# Patient Record
Sex: FEMALE | Race: BLACK OR AFRICAN AMERICAN | NOT HISPANIC OR LATINO | Employment: FULL TIME | URBAN - METROPOLITAN AREA
[De-identification: names, ages, dates, MRNs, and addresses within clinical notes are randomized per-mention and may not be internally consistent; named-entity substitution may affect disease eponyms.]

---

## 2019-07-15 ENCOUNTER — OFFICE VISIT (OUTPATIENT)
Dept: FAMILY MEDICINE | Facility: CLINIC | Age: 54
End: 2019-07-15
Payer: COMMERCIAL

## 2019-07-15 VITALS
SYSTOLIC BLOOD PRESSURE: 106 MMHG | DIASTOLIC BLOOD PRESSURE: 68 MMHG | OXYGEN SATURATION: 97 % | HEART RATE: 73 BPM | RESPIRATION RATE: 18 BRPM | HEIGHT: 61 IN | TEMPERATURE: 98.1 F | BODY MASS INDEX: 28.89 KG/M2 | WEIGHT: 153 LBS

## 2019-07-15 DIAGNOSIS — Z12.39 SCREENING FOR BREAST CANCER: ICD-10-CM

## 2019-07-15 DIAGNOSIS — Z86.2 H/O THROMBOCYTOSIS: ICD-10-CM

## 2019-07-15 DIAGNOSIS — Z12.11 SCREENING FOR COLON CANCER: ICD-10-CM

## 2019-07-15 DIAGNOSIS — D50.9 IRON DEFICIENCY ANEMIA, UNSPECIFIED IRON DEFICIENCY ANEMIA TYPE: Primary | ICD-10-CM

## 2019-07-15 LAB
BASOPHILS # BLD AUTO: 0.1 THOU/UL (ref 0–0.2)
BASOPHILS NFR BLD AUTO: 1 % (ref 0–2)
EOSINOPHIL # BLD AUTO: 0.2 THOU/UL (ref 0–0.4)
EOSINOPHIL NFR BLD AUTO: 3 % (ref 0–6)
ERYTHROCYTE [DISTWIDTH] IN BLOOD BY AUTOMATED COUNT: 38 % (ref 11–14.5)
HCT VFR BLD AUTO: 38.7 % (ref 35–47)
HGB BLD-MCNC: 10.3 G/DL (ref 12–16)
LYMPHOCYTES # BLD AUTO: 2 THOU/UL (ref 0.8–4.4)
LYMPHOCYTES NFR BLD AUTO: 24 % (ref 20–40)
MCH RBC QN AUTO: 20.8 PG (ref 27–34)
MCHC RBC AUTO-ENTMCNC: 26.6 G/DL (ref 32–36)
MCV RBC AUTO: 78 FL (ref 80–100)
MONOCYTES # BLD AUTO: 0.7 THOU/UL (ref 0–0.9)
MONOCYTES NFR BLD AUTO: 9 % (ref 2–10)
NEUTROPHILS # BLD AUTO: 5.1 THOU/UL (ref 2–7.7)
NEUTROPHILS NFR BLD AUTO: 63 % (ref 50–70)
OVALOCYTES BLD QL SMEAR: ABNORMAL
PLATELET # BLD AUTO: 275 THOU/UL (ref 140–440)
PLATELET BLD QL SMEAR: NORMAL
PMV BLD AUTO: ABNORMAL FL (ref 8.5–12.5)
RBC # BLD AUTO: 4.96 MILL/UL (ref 3.8–5.4)
SCHISTOCYTES BLD QL SMEAR: ABNORMAL
WBC # BLD AUTO: 8.1 THOU/UL (ref 4–11)

## 2019-07-15 SDOH — HEALTH STABILITY: MENTAL HEALTH: HOW OFTEN DO YOU HAVE A DRINK CONTAINING ALCOHOL?: NEVER

## 2019-07-15 ASSESSMENT — MIFFLIN-ST. JEOR: SCORE: 1223.44

## 2019-07-15 NOTE — PROGRESS NOTES
Preceptor Attestation:   Patient seen, evaluated and discussed with the resident. I have verified the content of the note, which accurately reflects my assessment of the patient and the plan of care.   Supervising Physician:  Donaldo Colón MD

## 2019-07-15 NOTE — PROGRESS NOTES
ASSESSMENT AND PLAN     1. Iron deficiency anemia, unspecified iron deficiency anemia type  Admitted to the hospital from 7/7/19 to 7/9/2019 secondary to iron deficiency anemia.  Received 2 units of packed red blood cells along with IV iron transfusion.  Has had a history of menorrhagia for several years.  She does have a year between 2013 and 2014 where she did not have her menses.  Return to menses occur at 2014 and she has been having vaginal bleeding every month since then.  Need to rule out all gynecology causes of anemia.  Possible etiologies include uterine/endometrial cancer, fibroid, et will refer to OB/GYN at this time.  Will likely need pelvic ultrasound, in which they could do in clinic.  We will follow-up in the next 4 weeks to discuss.  - OB/GYN REFERRAL; Future  - CBC w/ Diff and Plt ([facility])  - Manual Diff ([id number])    2. H/O thrombocytosis  Likely secondary to reactive thrombocytosis in setting of iron deficiency anemia while in the hospital.  Will retrieve a new platelet count today.  Other possibilities and after consulting with hematology while patient was in the hospital would recommend that if still remains elevated to do a femoral sided ptosis work-up including a Leonidas 2 analysis.  We will hold off on that today.    3. Screening for breast cancer  Has never had a mammogram before.  Would love to be screened for mammogram today given recent hospitalization.  - PCS Use: Screening Digital Bilateral Mammogram; Future  - GASTROENTEROLOGY ADULT REF PROCEDURE ONLY; Future    4. Screening for colon cancer  Admitted to the hospital with iron deficiency anemia.  Has never been screened for colon cancer before.  The etiology of iron deficiency anemia includes colon cancer.  Need to rule out all causes of her anemia other than OB/GYN.  Agreeable to get a colon cancer screening  - PCS Use: Screening Digital Bilateral Mammogram; Future  - GASTROENTEROLOGY ADULT REF PROCEDURE ONLY; Future      RTC in  4 weeks for follow up or sooner if develops new or worsening symptoms.    The patient was seen by, and discussed with, Dr. Angel Colón who agrees with the plan.    Elayne Cam MD PGY2  [facility]         [facility]       Lenora Biggs is a 53 year old  female with a PMH significant for:   There is no problem list on file for this patient.    She presents for a follow up from her hospital stay for iron deficiency anemia.      From her discharge summary from [facility] Hospitalization from 7/7/19-7/9/19  She presented to the [facility] ED with dyspnea on exertion,moderate right upper back pain and lower back pain/abdominal cramping type pain consistent with menstrual symptoms. Initial evaluation in the ED revealed a hemoglobin of 7.0, with platelets of 760. BMP unremarkable and LFTs were normal. Troponin negative. UA negative for blood or RBCs. Normal EKG. Follow up Hgb was 6.8. CBC with diff showed microcytic anemia with thrombocytosis consistent with anemia and secondary thrombocytosis. Iron studies were all low, LDH was normal, FSH and TSH normal. Patient discussed with pathology who recommended JAK2 analysis and repeat platelet count one week after discharge. She received 1 unit pRBC and 1000mg iron dextran on 7/8/19 and tolerated both well. Repeat Hbgs were 8.4 then 8.1. Patient discharged home with follow up with PCP. Prescribed ferrous sulfate 325mg BID and ibuprofen 200mg and tizanidine 4mg    History of menses every month. Has not had any vaginal bleeding since hospitalization. 2013 stopped for a year and then started back on 2014.     Has had increased fatigue in the morning. Iron pills have been going down well. Has not had post ingestion nausea or vomiting. Having normal BMs. Back pain is much improved, having slight pain intermittently with movements. Denies abdominal pain, myalgias, polyarthralgias.       PMH, Medications and Allergies were reviewed and updated as  "needed.        REVIEW OF SYSTEMS     ROS reviewed in HPI        OBJECTIVE     Vitals:    07/15/19 1404   BP: 106/68   BP Location: Right arm   Patient Position: Sitting   Cuff Size: Adult Regular   Pulse: 73   Resp: 18   Temp: 98.1  F (36.7  C)   TempSrc: Oral   SpO2: 97%   Weight: 69.4 kg (153 lb)   Height: 1.537 m (5' 0.5\")     Body mass index is 29.39 kg/m .    Constitutional: Awake, alert, cooperative, no apparent distress, and appears stated age.  Eyes: PERRL, EOMI, normal conjunctiva/sclerae  ENT: Normocephalic, atraumatic; no oropharyngeal pallor or erythema  Neck: Supple, symmetrical, trachea midline, no adenopathy, thyroid symmetric, not enlarged and no tenderness, skin normal.  Hematologic / Lymphatic: No cervical lymphadenopathy and no supraclavicular lymphadenopathy.  Back: Symmetric, no curvature, no spinal/paraspinal tenderness, no CVA tenderness  Lungs: No increased work of breathing, good air exchange, clear to auscultation bilaterally, no crackles or wheezing.  Cardiovascular: Regular rate and rhythm, normal S1 and S2, no S3 or S4, and no murmur noted.  Chest / Breast: Breasts symmetrical, skin without lesion(s), no nipple retraction or dimpling, no nipple discharge, no masses palpated, no axillary or supraclavicular adenopathy.  Abdomen: No scars, normal bowel sounds, soft, non-distended, non-tender, no masses palpated, no hepatosplenomegaly.  Musculoskeletal: No redness, warmth, or swelling of the joints.  Full range of motion noted.  Motor strength is 5 out of 5 all extremities bilaterally.  Tone is normal.  Neurologic: Grossly normal  Neuropsychiatric: Normal affect, mood, orientation, memory and insight.  Skin: No rashes, erythema, pallor, petechia or purpura.    No results found for this or any previous visit (from the past 24 hour(s)).          "

## 2019-07-15 NOTE — PATIENT INSTRUCTIONS
Great to see you today Elizabeth!    Our clinic will call you to set up your mammogram and colonoscopy.     Please go down to the lab to have your blood levels drawn. Also, stop at the desk in the lobby to help with your gynecology referral.    Please make an appointment to see me in the next couple weeks for a complete physical.    Thank you,    Dr. Cam    OB/GYN REFERRAL faxed they will call patient to schedule  [facility]  Phone: [phone]  Fax: [phone]   Referral, demographics, labs and office note faxed to [phone].    GASTROENTEROLOGY ADULT REF PROCEDURE ONLY  July 17, 2019 Online referral placed with [facility] who will contact patient to schedule.     [facility]  Phone [phone]  Fax: [phone]    [facility]  [address]  [place], [place] [address]    [facility]  [address]Cl[address]) [address] (Endoscopy Center)  [place], [place] [address]    [facility]  [address] [place] [address]    [facility]  [address] (use the outside entrance)  [place], [place] [address]    [facility]  [address]  [place], [place] [address]      [facility]  Address: [address] [place] [address]  Phone number: [phone]    Faxed referral over they will contact the patient to schedule the appointment. GALI Pugh

## 2019-07-17 ENCOUNTER — RECORDS - HEALTHEAST (OUTPATIENT)
Dept: ADMINISTRATIVE | Facility: OTHER | Age: 54
End: 2019-07-17

## 2019-07-17 ENCOUNTER — TELEPHONE (OUTPATIENT)
Dept: FAMILY MEDICINE | Facility: CLINIC | Age: 54
End: 2019-07-17

## 2019-07-17 ENCOUNTER — TRANSFERRED RECORDS (OUTPATIENT)
Dept: HEALTH INFORMATION MANAGEMENT | Facility: CLINIC | Age: 54
End: 2019-07-17

## 2019-07-17 NOTE — TELEPHONE ENCOUNTER
Clinic recieved a call to fax records to [facility] for patients appointment on 07/17/19. This SW assisted and faxed office visit notes from 07/15/19 and labs to [facility]. Faxed at 9:00am on this date.     RODGER Quesada

## 2019-07-22 ENCOUNTER — RECORDS - HEALTHEAST (OUTPATIENT)
Dept: RADIOLOGY | Facility: CLINIC | Age: 54
End: 2019-07-22

## 2019-07-22 ENCOUNTER — HOSPITAL ENCOUNTER (OUTPATIENT)
Dept: MAMMOGRAPHY | Facility: CLINIC | Age: 54
Discharge: HOME | End: 2019-07-22

## 2019-07-22 DIAGNOSIS — Z12.31 VISIT FOR SCREENING MAMMOGRAM: ICD-10-CM

## 2019-07-28 PROBLEM — D50.9 ANEMIA, IRON DEFICIENCY: Status: ACTIVE | Noted: 2019-07-28

## 2019-08-05 ENCOUNTER — OFFICE VISIT (OUTPATIENT)
Dept: FAMILY MEDICINE | Facility: CLINIC | Age: 54
End: 2019-08-05
Payer: COMMERCIAL

## 2019-08-05 ENCOUNTER — DOCUMENTATION ONLY (OUTPATIENT)
Dept: PSYCHOLOGY | Facility: CLINIC | Age: 54
End: 2019-08-05

## 2019-08-05 VITALS
SYSTOLIC BLOOD PRESSURE: 133 MMHG | BODY MASS INDEX: 28.25 KG/M2 | DIASTOLIC BLOOD PRESSURE: 80 MMHG | TEMPERATURE: 98.2 F | HEIGHT: 61 IN | OXYGEN SATURATION: 98 % | WEIGHT: 149.6 LBS | HEART RATE: 63 BPM | RESPIRATION RATE: 16 BRPM

## 2019-08-05 DIAGNOSIS — G47.00 INSOMNIA, UNSPECIFIED TYPE: Primary | ICD-10-CM

## 2019-08-05 DIAGNOSIS — D50.9 IRON DEFICIENCY ANEMIA, UNSPECIFIED IRON DEFICIENCY ANEMIA TYPE: ICD-10-CM

## 2019-08-05 RX ORDER — TRAZODONE HYDROCHLORIDE 50 MG/1
50 TABLET, FILM COATED ORAL AT BEDTIME
Qty: 30 TABLET | Refills: 3 | Status: SHIPPED | OUTPATIENT
Start: 2019-08-05 | End: 2019-10-15

## 2019-08-05 RX ORDER — FERROUS SULFATE 325(65) MG
325 TABLET ORAL
COMMUNITY

## 2019-08-05 ASSESSMENT — MIFFLIN-ST. JEOR: SCORE: 1208.02

## 2019-08-05 NOTE — PROGRESS NOTES
Review of Dr. Cam's order and note indicates that this is a referral for psychotherapy for treatment of anxiety, insomnia, and fatigue. Currently we are closed to in-house referrals so will ask care coordinator to please reach out to patient and provide the patient with the following referral options:     [facility]  [address]   [address]  [place] [address]  [phone] (for appointments)  Fax: [phone]  [facility]  [address]  [address]  [place] [address]  Phone:  [phone]  Fax: [phone]  Hours:  Monday - Friday 8:30 - 5:00pm    [facility]  [address]  [place] [address]  [phone]    [facility]  [address], [place] [address]  [phone]    Let me know if you have questions or would like additional follow up from me.  Thanks!        Elayne Bustamante, PhD  Behavioral Health Fellow    Disclaimer  The above treatment recommendations are based on consultation with the patient's primary care provider and a review of relevant information in EPIC.? I have not personally examined the patient.? All recommendations should be implemented with considerations of the patient's relevant prior history and current clinical status.  Please contact me with any questions about the care of this patient.

## 2019-08-05 NOTE — LETTER
August 8, 2019      Lenora Biggs  [id number]  [place] [address]        Dear Lenora,    Our records indicate that you and your provider recently discussed you receiving psychotherapy. Unfortunately our mental health team is unable to take on new patients at this time. Below is a list of psychotherapy providers in your community; please consider setting up an appointment with one of these clinics. Feel free to contact [facility] at [phone] with any questions or concerns.    [facility]  [address] [place] [address]   [address]  [place] [address]  [phone] (for appointments)  Fax: [phone]  [facility] - [address]  [address]  [place] [address]  Phone:  [phone]  Fax: [phone]  Hours:  Monday - Friday 8:30 - 5:00pm     [facility]  [address]  [place] [address]  [phone]     [facility]  [address]  [address]  [place], [place] [address]  [phone]      Sincerely,    RICKEY Valle

## 2019-08-05 NOTE — PROGRESS NOTES
Preceptor Attestation:   Patient seen, evaluated and discussed with the resident. I have verified the content of the note, which accurately reflects my assessment of the patient and the plan of care.   Supervising Physician:  Rui Milton MD

## 2019-08-05 NOTE — PATIENT INSTRUCTIONS
Great to see you today Elizabeth!    Great job on following up with OB/GYN and working with Dr. Yeboah. Please let me know how the procedure goes and see me after.    We will start you on a medication called trazadone for sleep. You will start on 50mg nightly. Please let me know if you experience any symptoms like dizziness, headache, or confusion. Call clinic any time to get in contact with me. Also, the mental health team will be contacting you soon.     Please see me in 2 weeks for a follow up visit.    Thank you,  Dr. Cam      MENTAL HEALTH REFERRAL    August 5, 2019  Mental Health referral routed to behavioral health team for recommendations. See Documentation Only encounter for more information.  RICKEY Valle  8/5/2019
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
## 2019-08-05 NOTE — PROGRESS NOTES
Behavioral Health Team,    Patient is being referred for mental health services by their provider, Dr. Cam.  Please advise if we are able to see patient for in house treatment or if a community option would be best.    Thank you,    RICKEY Valle

## 2019-08-06 NOTE — PROGRESS NOTES
I have reviewed and agree with the behavioral health fellow's summary and recommendations.  Chacha Palmer, PhD., LP

## 2019-08-06 NOTE — PROGRESS NOTES
RAIMUNDO contacted pt ([phone]) regarding mental health referral. No answer, left message requesting call to clinic.    RICKEY Valle  8/6/2019    ADDENDUM 8/8/2019 2:10 PM:  RAIMUNDO contacted pt ([phone]) regarding mental health referral. No answer, left message requesting call to clinic.    As pt has yet to contact clinic, RAIMUNDO will mail letter with clinics recommended by mental health team.    RICKEY Valle  8/6/2019

## 2019-08-19 ENCOUNTER — OFFICE VISIT (OUTPATIENT)
Dept: FAMILY MEDICINE | Facility: CLINIC | Age: 54
End: 2019-08-19
Payer: COMMERCIAL

## 2019-08-19 VITALS
HEART RATE: 77 BPM | TEMPERATURE: 97.9 F | HEIGHT: 60 IN | BODY MASS INDEX: 29.96 KG/M2 | DIASTOLIC BLOOD PRESSURE: 76 MMHG | WEIGHT: 152.6 LBS | SYSTOLIC BLOOD PRESSURE: 116 MMHG

## 2019-08-19 DIAGNOSIS — Z01.818 PREOP GENERAL PHYSICAL EXAM: Primary | ICD-10-CM

## 2019-08-19 DIAGNOSIS — D50.0 IRON DEFICIENCY ANEMIA DUE TO CHRONIC BLOOD LOSS: ICD-10-CM

## 2019-08-19 LAB
BASOPHILS # BLD AUTO: 0.1 THOU/UL (ref 0–0.2)
BASOPHILS NFR BLD AUTO: 1 % (ref 0–2)
EOSINOPHIL # BLD AUTO: 0.2 THOU/UL (ref 0–0.4)
EOSINOPHIL NFR BLD AUTO: 2 % (ref 0–6)
ERYTHROCYTE [DISTWIDTH] IN BLOOD BY AUTOMATED COUNT: ABNORMAL % (ref 11–14.5)
HCT VFR BLD AUTO: 44.2 % (ref 35–47)
HGB BLD-MCNC: 14.1 G/DL (ref 12–16)
LYMPHOCYTES # BLD AUTO: 1.9 THOU/UL (ref 0.8–4.4)
LYMPHOCYTES NFR BLD AUTO: 22 % (ref 20–40)
MCH RBC QN AUTO: 26.7 PG (ref 27–34)
MCHC RBC AUTO-ENTMCNC: 31.9 G/DL (ref 32–36)
MCV RBC AUTO: 84 FL (ref 80–100)
MONOCYTES # BLD AUTO: 0.5 THOU/UL (ref 0–0.9)
MONOCYTES NFR BLD AUTO: 5 % (ref 2–10)
NEUTROPHILS # BLD AUTO: 6.1 THOU/UL (ref 2–7.7)
NEUTROPHILS NFR BLD AUTO: 70 % (ref 50–70)
OVALOCYTES BLD QL SMEAR: ABNORMAL
PLATELET # BLD AUTO: 174 THOU/UL (ref 140–440)
PLATELET BLD QL SMEAR: NORMAL
PMV BLD AUTO: ABNORMAL FL (ref 8.5–12.5)
RBC # BLD AUTO: 5.28 MILL/UL (ref 3.8–5.4)
WBC # BLD AUTO: 8.7 THOU/UL (ref 4–11)

## 2019-08-19 ASSESSMENT — MIFFLIN-ST. JEOR: SCORE: 1213.69

## 2019-08-19 NOTE — PROGRESS NOTES
[address] [address]  Phone: [phone]  Fax: [phone]    8/19/2019    Adult PRE-OP Evaluation:    Lenora Biggs, 1965 presents for pre-operative evaluation and assessment as requested by Dr. Yeboah, prior to undergoing surgery/procedure for treatment of  Abnormal uterine bleeding .    Proposed procedure: Dilation and curettage, endometrial biopsy and uterine ablation.     Date of Surgery/ Procedure: 8/22/19  Hospital/Surgical Facility: [facility], Fax: [phone]     Primary Physician: Elayne Cam MD PGY2  Type of Anesthesia Anticipated: General  History of anesthesia complications: NONE  History of  abnormal bleeding: YES: Personal HX (Hospitalization on 7/7/19-7/9/19 for iron deficiency anemia/abnormal uterine bleeding)  History of blood transfusions: YES.  No complications.  Patient has a Health Care Directive or Living Will:  NO    Preoperative Questions   1. NO - Do you have a history of heart attack, stroke, stent, bypass or surgery on an artery in the head, neck, heart or legs?  2. NO - Do you ever have any pain or discomfort in your chest?  3. NO - Have you ever had a severe pain across the front of your chest lasting for half an hour or more?  4. NO - Do you have a history of Congestive Heart Failure?  5. NO - Are you troubled by shortness of breath when: walking on the level/ up a slight hill/ at night?  6. NO - Does your chest ever sound wheezy or whistling?  7. NO - Do you currently have a cold, bronchitis or other respiratory infection?  8. NO - Have you had a cold, bronchitis or other respiratory infection within the last 2 weeks?  9. YES - Do you usually have a cough? Has a nighttime cough. History of 0.5ppd smoking. Productive cough.   10. NO - Do you sometimes get pains in the calves of your legs when you walk?  11. NO - Do you or anyone in your family have previous history of blood clots?  12. NO - Do you or does anyone in your  family have a serious bleeding problem such as prolonged bleeding following surgeries or cuts?  13. YES - Have you ever had problems with anemia or been told to take iron pills? Iron deficiency anemia caused by chronic blood loss. Has history of abnormal uterine bleeding.   14. YES - Have you had any abnormal blood loss such as black, tarry or bloody stools, or abnormal vaginal bleeding? Yes, hospitalized and needed 2 units of pRBCs on 7/07/19-7/09/19. Has been stable after.   15. YES - Have you ever had a blood transfusion? 2 units received at hospital stay for chronic blood loss anemia.   16. NO - Have you or any of your relatives ever had problems with anesthesia?  17. NO - Do you have sleep apnea, excessive snoring or daytime drowsiness?  18. NO - Do you have any prosthetic heart valves?  19. NO - Do you have prosthetic joints?  20. NO - Is there any chance that you may be pregnant?    Patient Active Problem List   Diagnosis     Anemia, iron deficiency         Current Outpatient Medications on File Prior to Visit:  ferrous sulfate (FEROSUL) 325 (65 Fe) MG tablet Take 325 mg by mouth 3 times daily (with meals)   traZODone (DESYREL) 50 MG tablet Take 1 tablet (50 mg) by mouth At Bedtime     No current facility-administered medications on file prior to visit.     OTC products: None, except as noted above, no recent use of OTC ASA, NSAIDS or Steroids and no use of herbal medications or other supplements    No Known Allergies  Latex Allergy: NO    Social History     Socioeconomic History     Marital status: Single     Spouse name: Not on file     Number of children: Not on file     Years of education: Not on file     Highest education level: Not on file   Occupational History     Not on file   Social Needs     Financial resource strain: Not on file     Food insecurity:     Worry: Not on file     Inability: Not on file     Transportation needs:     Medical: Not on file     Non-medical: Not on file   Tobacco Use      Smoking status: Current Every Day Smoker     Types: Cigarettes     Smokeless tobacco: Never Used   Substance and Sexual Activity     Alcohol use: Never     Frequency: Never     Drug use: Yes     Types: Marijuana     Sexual activity: Not on file   Lifestyle     Physical activity:     Days per week: Not on file     Minutes per session: Not on file     Stress: Not on file   Relationships     Social connections:     Talks on phone: Not on file     Gets together: Not on file     Attends Temple service: Not on file     Active member of club or organization: Not on file     Attends meetings of clubs or organizations: Not on file     Relationship status: Not on file     Intimate partner violence:     Fear of current or ex partner: Not on file     Emotionally abused: Not on file     Physically abused: Not on file     Forced sexual activity: Not on file   Other Topics Concern     Not on file   Social History Narrative     Not on file       REVIEW OF SYSTEMS:   Constitutional, HEENT, cardiovascular, pulmonary, gi and gu systems are negative, except as otherwise noted.    EXAM:     Patient Vitals for the past 24 hrs:   BP Temp Pulse Height Weight   08/19/19 1006 116/76 97.9  F (36.6  C) 77 1.524 m (5') 69.2 kg (152 lb 9.6 oz)     Body mass index is 29.8 kg/m .  GENERAL: healthy, alert and no distress  EYES: Eyes grossly normal to inspection, extraocular movements - intact, and PERRL  HENT: ear canals- normal; TMs- normal; Nose- normal; Mouth- no ulcers, no lesions  NECK: no tenderness, no adenopathy, no asymmetry, no masses, no stiffness; thyroid- normal to palpation  RESP: lungs clear to auscultation - no rales, no rhonchi, no wheezes  CV: regular rates and rhythm, normal S1 S2, no S3 or S4 and no murmur, no click or rub  ABDOMEN: soft, no tenderness, no  hepatosplenomegaly, no masses, normal bowel sounds  MS: extremities- no gross deformities noted, no edema  SKIN: no suspicious lesions, no rashes  NEURO: strength and  tone- normal, sensory exam- grossly normal, mentation- intact, speech- normal, reflexes- symmetric  BACK: no CVA tenderness, no paralumbar tenderness  PSYCH: Alert and oriented times 3; speech- coherent , normal rate and volume; able to articulate logical thoughts  LYMPHATICS: ant. cervical- normal, post. cervical- normal    DIAGNOSTICS:      EKG: Not indicated due to non-vascular surgery and low risk of event (age <65 and without cardiac risk factors)  Hemoglobin (indicated for history of anemia or procedure with significant blood loss such as tonsillectomy, major intraperitoneal surgery, vascular surgery, major spine surgery, total joint replacement)  Labs Drawn and in Process: Hemglobin  Unresulted Labs Ordered in the Past 30 Days of this Admission     No orders found from 7/20/2019 to 8/20/2019.          RISK ASSESSMENT:     Cardiovascular Risk:  -Patient is able to participate in strenuous activities without chest pain.  -The patient does not have chest pain  -Patient does not have a history of congestive heart failure.    -The patient does not have a history of stroke and does not have a history of valvular disease.    Pulmonary Risk:  -In terms of risk factors for pulmonary complication, the patient has no risk factors.    Perioperative Complications:  -The patient has a history of bleeding or clotting problems in the past.    -The patient has not had complications from surgeries.    -The patient does not have a family history of any anesthesia or surgical complications.      IMPRESSION:   Reason for surgery/procedure: abnormal uterine bleeding, iron deficiency anemia.    The proposed surgical procedure is considered LOW risk.    For above listed surgery and anesthesia:   Patient is at moderate risk for surgery/procedure and perioperative/procedure complications. History of chronic blood loss.     RECOMMENDATIONS:     Labs:  Hgb  Platelets drawn on 7/15/19 and were within normal range    Fasting:  NPO for 12  hours prior to surgery    Preop Plan:  --Approval given to proceed with proposed procedure, without further diagnostic evaluation  --Patient has  Anemia and does not require treatment prior to surgery.  Monitor Hemoglobin postoperatively.    Medications:  Patient should hold their regular medications the morning of surgery unless otherwise instructed.    Hold aspirin 7 days prior to surgery.  Hold ibuprofen for 5 days prior.      Elayne Cam MD PGY2  [facility]    Please contact our office if there are any further questions or information required about this patient.

## 2019-08-19 NOTE — PROGRESS NOTES
Preceptor attestation:  Vital signs reviewed: /76   Pulse 77   Temp 97.9  F (36.6  C)   Ht 1.524 m (5')   Wt 69.2 kg (152 lb 9.6 oz)   LMP 07/22/2019 (Exact Date)   BMI 29.80 kg/m      Patient seen, evaluated, and discussed with the resident.  I have verified the content of the note, which accurately reflects my assessment of the patient and the plan of care.    Supervising physician: Aracely Rashid MD  [facility]

## 2019-08-22 ENCOUNTER — SURGERY - HEALTHEAST (OUTPATIENT)
Dept: SURGERY | Facility: HOSPITAL | Age: 54
End: 2019-08-22

## 2019-08-22 ENCOUNTER — ANESTHESIA - HEALTHEAST (OUTPATIENT)
Dept: SURGERY | Facility: HOSPITAL | Age: 54
End: 2019-08-22

## 2019-08-22 ASSESSMENT — MIFFLIN-ST. JEOR: SCORE: 1205.5

## 2019-08-30 ASSESSMENT — MIFFLIN-ST. JEOR
SCORE: 1205.5
SCORE: 1205.5

## 2019-09-04 ENCOUNTER — ANESTHESIA - HEALTHEAST (OUTPATIENT)
Dept: SURGERY | Facility: AMBULATORY SURGERY CENTER | Age: 54
End: 2019-09-04

## 2019-09-05 ENCOUNTER — TRANSFERRED RECORDS (OUTPATIENT)
Dept: HEALTH INFORMATION MANAGEMENT | Facility: CLINIC | Age: 54
End: 2019-09-05

## 2019-09-05 ENCOUNTER — HOSPITAL ENCOUNTER (OUTPATIENT)
Dept: SURGERY | Facility: AMBULATORY SURGERY CENTER | Age: 54
Discharge: HOME | End: 2019-09-05
Attending: OBSTETRICS & GYNECOLOGY | Admitting: OBSTETRICS & GYNECOLOGY
Payer: COMMERCIAL

## 2019-09-05 ENCOUNTER — SURGERY - HEALTHEAST (OUTPATIENT)
Dept: SURGERY | Facility: AMBULATORY SURGERY CENTER | Age: 54
End: 2019-09-05

## 2019-09-05 DIAGNOSIS — N93.9 ABNORMAL UTERINE BLEEDING (AUB): ICD-10-CM

## 2019-09-05 DIAGNOSIS — N95.0 PMB (POSTMENOPAUSAL BLEEDING): ICD-10-CM

## 2019-09-05 LAB
DIPSTICK EXPIRATION DATE - HISTORICAL: NORMAL
DIPSTICK LOT NUMBER - HISTORICAL: NORMAL
POC PREG URINE (HCG) HE - HISTORICAL: NEGATIVE
POC SPECIFIC GRAVITY, URINE - HISTORICAL: NORMAL
POCT KIT EXPIRATION DATE - HISTORICAL: NORMAL
POCT KIT LOT NUMBER HE - HISTORICAL: NORMAL
POCT NEGATIVE CONTROL HE - HISTORICAL: NORMAL
POCT POSITIVE CONTROL HE - HISTORICAL: NORMAL

## 2019-09-05 ASSESSMENT — MIFFLIN-ST. JEOR
SCORE: 1205.5
SCORE: 1205.5

## 2019-09-11 ENCOUNTER — TRANSFERRED RECORDS (OUTPATIENT)
Dept: HEALTH INFORMATION MANAGEMENT | Facility: CLINIC | Age: 54
End: 2019-09-11

## 2019-09-27 ENCOUNTER — TRANSFERRED RECORDS (OUTPATIENT)
Dept: HEALTH INFORMATION MANAGEMENT | Facility: CLINIC | Age: 54
End: 2019-09-27

## 2019-10-10 DIAGNOSIS — G47.00 INSOMNIA, UNSPECIFIED TYPE: ICD-10-CM

## 2019-10-15 RX ORDER — TRAZODONE HYDROCHLORIDE 50 MG/1
50 TABLET, FILM COATED ORAL AT BEDTIME
Qty: 30 TABLET | Refills: 3 | Status: SHIPPED | OUTPATIENT
Start: 2019-10-15 | End: 2019-12-30

## 2019-12-30 ENCOUNTER — OFFICE VISIT (OUTPATIENT)
Dept: FAMILY MEDICINE | Facility: CLINIC | Age: 54
End: 2019-12-30
Payer: COMMERCIAL

## 2019-12-30 VITALS
SYSTOLIC BLOOD PRESSURE: 115 MMHG | HEART RATE: 74 BPM | TEMPERATURE: 98.4 F | HEIGHT: 61 IN | OXYGEN SATURATION: 100 % | BODY MASS INDEX: 30.36 KG/M2 | WEIGHT: 160.8 LBS | RESPIRATION RATE: 14 BRPM | DIASTOLIC BLOOD PRESSURE: 80 MMHG

## 2019-12-30 DIAGNOSIS — J30.2 SEASONAL ALLERGIC RHINITIS, UNSPECIFIED TRIGGER: Primary | ICD-10-CM

## 2019-12-30 RX ORDER — CETIRIZINE HYDROCHLORIDE 10 MG/1
10 TABLET ORAL DAILY
Qty: 30 TABLET | Refills: 11 | Status: SHIPPED | OUTPATIENT
Start: 2019-12-30 | End: 2020-07-14

## 2019-12-30 RX ORDER — PSEUDOEPHEDRINE HCL 60 MG
60 TABLET ORAL EVERY 4 HOURS PRN
Qty: 60 TABLET | Refills: 1 | Status: SHIPPED | OUTPATIENT
Start: 2019-12-30

## 2019-12-30 RX ORDER — FLUTICASONE PROPIONATE 50 MCG
1 SPRAY, SUSPENSION (ML) NASAL DAILY
Qty: 16 G | Refills: 3 | Status: SHIPPED | OUTPATIENT
Start: 2019-12-30

## 2019-12-30 ASSESSMENT — MIFFLIN-ST. JEOR: SCORE: 1253.82

## 2019-12-30 NOTE — PROGRESS NOTES
"    There are no exam notes on file for this visit.  Chief Complaint   Patient presents with     Eye Problem     Itchy eyes, nose and throat x Saturday     Cough     Blood pressure 115/80, pulse 74, temperature 98.4  F (36.9  C), resp. rate 14, height 1.537 m (5' 0.5\"), weight 72.9 kg (160 lb 12.8 oz), SpO2 100 %, not currently breastfeeding.               HPI     Lenora Biggs is a 54 year old  female with a PMH significant for:     Patient Active Problem List   Diagnosis     Anemia, iron deficiency     She presents with sneezing and coughing since week ago Saturday, 9 days of illness.  Runny nose, congestion and itching throat.  No sore throat. No fevers or chills.  Feels like when her allergies act up.  She is never had allergy testing nor she she is allergic to.  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  She notes that other coworkers have as well.  She states that residents do not seem to be sick.  She has tried Claritin, benadryl sinus relief, vicks cough syrup. No SOB or wheezing.   No sick contacts.  No history of lung disease or asthma.    Declines flu shot today.    PMH, Medications and Allergies were reviewed and updated as needed.             Physical Exam:     Vitals:    12/30/19 0832   BP: 115/80   Pulse: 74   Resp: 14   Temp: 98.4  F (36.9  C)   SpO2: 100%   Weight: 72.9 kg (160 lb 12.8 oz)   Height: 1.537 m (5' 0.5\")     Body mass index is 30.89 kg/m .    Exam:  Constitutional: healthy, alert and no distress  Neck: Neck supple. No adenopathy. Thyroid symmetric, normal size,  Eyes: conjunctiva are clear.  ENT: Clear nasal discharge.  Turbinates are congested and erythematous.  TMs clear, Oropharynx clear with no erythema or exudates.  Postnasal drip noted.  Cardiovascular:RRR. No murmurs, clicks gallops or rub  Respiratory:  normal " respiratory rate and rhythm, lungs clear to auscultation. No wheezes or crackles.  Psychiatric: mentation appears normal and affect normal/bright      Assessment and Plan     Lenora was seen today for eye problem and cough.    Diagnoses and all orders for this visit:    Seasonal allergic rhinitis, unspecified trigger: Unclear whether this is allergies versus a viral URI.  We will give her Flonase Zyrtec and Sudafed to help with symptoms.  Can continue Benadryl at night to help with sleep and postnasal drip.  Also suggest that she try honey which she states she is doing.  She is concerned she will be able to follow through with the Flonase due to nasal sprays gagging her.  She states she will try it though.    If she continues to have trouble with symptoms and they are not resolving she should come back for further evaluation and possible refer to allergist for testing if this is truly a chronic issue for her.  Gave her a work note for the day off due to the severity of her cough and inability to sleep.  -     fluticasone (FLONASE) 50 MCG/ACT nasal spray; Spray 1 spray into both nostrils daily  -     cetirizine (ZYRTEC) 10 MG tablet; Take 1 tablet (10 mg) by mouth daily  -     pseudoePHEDrine (SUDAFED) 60 MG tablet; Take 1 tablet (60 mg) by mouth every 4 hours as needed for congestion    Follow-up as needed.     Options for treatment and/or follow-up care were reviewed with the patient. Lenora Biggs was engaged and actively involved in the decision making process. She verbalized understanding of the options discussed and was satisfied with the final plan.    Maggie Manzo MD

## 2019-12-30 NOTE — LETTER
RETURN TO WORK/SCHOOL FORM    12/30/2019    Re: Lenora Biggs  1965      To Whom It May Concern:     Lenora Biggs was seen in clinic today..  She may return to work without restrictions on 12/31/19          Restrictions:  None      Maggie Manzo MD  12/30/2019 9:00 AM

## 2020-03-19 DIAGNOSIS — G47.00 INSOMNIA, UNSPECIFIED TYPE: ICD-10-CM

## 2020-03-19 RX ORDER — TRAZODONE HYDROCHLORIDE 50 MG/1
TABLET, FILM COATED ORAL
Qty: 30 TABLET | Refills: 3 | Status: SHIPPED | OUTPATIENT
Start: 2020-03-19 | End: 2020-07-14

## 2020-04-16 ENCOUNTER — TELEPHONE (OUTPATIENT)
Dept: FAMILY MEDICINE | Facility: CLINIC | Age: 55
End: 2020-04-16

## 2020-04-16 NOTE — TELEPHONE ENCOUNTER
Reached out to patient during COVID19 Clinic outreach. Reassured patient that [facility] is still open and has started implementing phone and video appointments to help patient remain safe at home.     Patient reports the following concerns: n/a    Per patient request, patient is scheduled for a visit to address their concerns on the following date: n/a       Offered MyChart.    Called NA, voicemail is full. Letter sent    Oliver Baker CMA

## 2020-04-16 NOTE — LETTER
April 16, 2020      Lneora Biggs   [id number]  [place] [address]        Dear Lenora,      We tried reaching you by phone but were unable to connect with you. We are reaching out to see how you are doing. This is a very stressful time in the world, which can cause an increase in personal stress and anxiety.     Our clinic is open.  We are here for you and are ready to meet all of your healthcare needs.  We have delayed preventive care until July.  We want everyone who can to stay home during this time for their health and the health of all.  We are now having most visits over the phone, but will see people in person if your doctor agrees that it is necessary.  We also will have video visits starting on April 6, 2020.      Call us with any questions or concerns you may have, and know that we are all in this together.       Sincerely,     Your team at [facility]  [phone]      MD

## 2020-05-21 ENCOUNTER — TELEPHONE (OUTPATIENT)
Dept: FAMILY MEDICINE | Facility: CLINIC | Age: 55
End: 2020-05-21

## 2020-05-21 ENCOUNTER — VIRTUAL VISIT (OUTPATIENT)
Dept: FAMILY MEDICINE | Facility: CLINIC | Age: 55
End: 2020-05-21
Payer: COMMERCIAL

## 2020-05-21 VITALS — WEIGHT: 140 LBS | BODY MASS INDEX: 27.48 KG/M2 | HEIGHT: 60 IN

## 2020-05-21 DIAGNOSIS — R10.9 ACUTE RIGHT FLANK PAIN: Primary | ICD-10-CM

## 2020-05-21 DIAGNOSIS — R25.2 CRAMPS OF RIGHT LOWER EXTREMITY: ICD-10-CM

## 2020-05-21 DIAGNOSIS — D50.0 IRON DEFICIENCY ANEMIA DUE TO CHRONIC BLOOD LOSS: ICD-10-CM

## 2020-05-21 DIAGNOSIS — N93.9 ABNORMAL UTERINE BLEEDING: ICD-10-CM

## 2020-05-21 DIAGNOSIS — R10.9 ACUTE RIGHT FLANK PAIN: ICD-10-CM

## 2020-05-21 DIAGNOSIS — Z00.00 ROUTINE HEALTH MAINTENANCE: ICD-10-CM

## 2020-05-21 PROBLEM — F99 INSOMNIA DUE TO OTHER MENTAL DISORDER: Status: ACTIVE | Noted: 2020-05-21

## 2020-05-21 PROBLEM — F51.05 INSOMNIA DUE TO OTHER MENTAL DISORDER: Status: ACTIVE | Noted: 2020-05-21

## 2020-05-21 PROBLEM — F43.10 PTSD (POST-TRAUMATIC STRESS DISORDER): Status: ACTIVE | Noted: 2020-05-21

## 2020-05-21 LAB
APTT PPP: 30 SECONDS (ref 24–37)
BACTERIA: NORMAL
BILIRUBIN UR: NEGATIVE MG/DL
BLOOD UR: ABNORMAL MG/DL
BUN SERPL-MCNC: 8.9 MG/DL (ref 7–19)
CALCIUM SERPL-MCNC: 9.2 MG/DL (ref 8.5–10.1)
CASTS: NORMAL /LPF
CHLORIDE SERPLBLD-SCNC: 107.4 MMOL/L (ref 98–110)
CHOLEST SERPL-MCNC: 172.1 MG/DL (ref 0–200)
CHOLEST/HDLC SERPL: 3.7 {RATIO} (ref 0–5)
CO2 SERPL-SCNC: 26.5 MMOL/L (ref 20–32)
CREAT SERPL-MCNC: 0.5 MG/DL (ref 0.5–1)
CRYSTAL URINE: NORMAL /LPF
EPITHELIAL CELLS UR: NORMAL /LPF (ref 0–2)
GFR SERPL CREATININE-BSD FRML MDRD: >90 ML/MIN/1.7 M2
GLUCOSE SERPL-MCNC: 96.7 MG'DL (ref 70–99)
GLUCOSE URINE: NEGATIVE
HCG UR QL: NEGATIVE
HCT VFR BLD AUTO: 49.6 % (ref 35–47)
HDLC SERPL-MCNC: 46.3 MG/DL
HEMOGLOBIN: 15 G/DL (ref 11.7–15.7)
INR PPP: 0.93 (ref 0.9–1.1)
KETONES UR QL: NEGATIVE MG/DL
LDLC SERPL CALC-MCNC: 108 MG/DL (ref 0–129)
LEUKOCYTE ESTERASE UR: ABNORMAL
MCH RBC QN AUTO: 29.9 PG (ref 26.5–35)
MCHC RBC AUTO-ENTMCNC: 30.2 G/DL (ref 32–36)
MCV RBC AUTO: 98.8 FL (ref 78–100)
MUCOUS URINE: NORMAL LPF
NITRITE UR QL STRIP: NEGATIVE MG/DL
PH UR STRIP: 7 [PH] (ref 4.5–8)
PLATELET # BLD AUTO: 205 K/UL (ref 150–450)
POTASSIUM SERPL-SCNC: 4.2 MMOL/L (ref 3.2–4.6)
PROTEIN UR: ABNORMAL MG/DL
RBC # BLD AUTO: 5 M/UL (ref 3.8–5.2)
RBC URINE: <2 /HPF
SODIUM SERPL-SCNC: 140.2 MMOL/L (ref 132–142)
SP GR UR STRIP: 1.02 (ref 1–1.03)
TRIGL SERPL-MCNC: 89 MG/DL (ref 0–150)
UROBILINOGEN UR STRIP-ACNC: ABNORMAL E.U./DL
VLDL CHOLESTEROL: 17.8 MG/DL (ref 7–32)
WBC # BLD AUTO: 7.7 K/UL (ref 4–11)
WBC URINE: NORMAL /HPF

## 2020-05-21 RX ORDER — CYCLOBENZAPRINE HCL 5 MG
5 TABLET ORAL 2 TIMES DAILY PRN
Qty: 14 TABLET | Refills: 1 | Status: SHIPPED | OUTPATIENT
Start: 2020-05-21 | End: 2020-06-04

## 2020-05-21 ASSESSMENT — MIFFLIN-ST. JEOR: SCORE: 1151.54

## 2020-05-21 NOTE — PROGRESS NOTES
"Family Medicine Telephone Visit Note                   Telephone Visit Consent   Patient was verbally read the following and verbal consent was obtained.    \"Telephone visits are billed at different rates depending on your insurance coverage. During this emergency period, for some insurers they may be billed the same as an in-person visit.  Please reach out to your insurance provider with any questions.  If during the course of the call the physician/provider feels a telephone visit is not appropriate, you will not be charged for this service.\"    Name person giving consent:  Patient   Date verbal consent given:  5/21/2020  Time verbal consent given:  8:14 AM               Chief Complaint   Patient presents with     Back Pain     R side back pain x 1 week, states that the pain just came on, no injuries. Hurts to sleep or move, states that she has been laying on a heating pad- which does help and Ibuprofen              HPI   Patients name: Lenora  Appointment start time:  8:15 AM    Elizabeth Biggs is a 54 year old female who is presenting via telephone visit today regarding right sided back pain ongoing for the past week.     She reports one week of ongoing non radiating right paraspinal/flank pain which she characterizes as sharp and dull, and waxing and waning pain. She reports the pain \"feels like someone is hitting her in the side with a baseball bat.\" Pain is exacerbated with movement from side to side. Pain improves with stretching and bending over. Denies any previous trauma. Has attempted to take 800mg ibuprofen every 4-6 hours with only minimal relief. She tried to buy new shoes to see if would help, with no relief. Has had trouble sleeping as well secondary to the pain. Denies dysuria, urinary incontinence, urinary urgency, vaginal discharge, hematuria, fever/chills, nausea/vomiting, or abdominal pain. Having normal bowel movements.     Reports that she has had vaginal bleeding for the past two weeks. " History of endometrial ablation and fibroidectomy in 9/5/2019 and has had cyclical bleeding since every month for 5-7 days. History of cessation of her menses for 1-2 years. Has been taking her iron pill daily. Similar presentation to above in July 2019 where she was seen in the ED with significant back pain and found to have significant anemia requiring blood transfusion.     Reports several months of lower extremity cramping, especially in her right leg. Denies numbness/tingling/weakness, swelling or erythema in her legs. Able to ambulate without difficulty.     Smokes 1 pack per day. Stopped using marijuana 3 months ago.     Family History of recent CVAs in sister and mother.       Current Outpatient Medications   Medication Sig Dispense Refill     ferrous sulfate (FEROSUL) 325 (65 Fe) MG tablet Take 325 mg by mouth 3 times daily (with meals)       fluticasone (FLONASE) 50 MCG/ACT nasal spray Spray 1 spray into both nostrils daily 16 g 3     traZODone (DESYREL) 50 MG tablet TAKE 1 TABLET BY MOUTH AT BEDTIME 30 tablet 3     cetirizine (ZYRTEC) 10 MG tablet Take 1 tablet (10 mg) by mouth daily (Patient not taking: Reported on 5/21/2020) 30 tablet 11     pseudoePHEDrine (SUDAFED) 60 MG tablet Take 1 tablet (60 mg) by mouth every 4 hours as needed for congestion (Patient not taking: Reported on 5/21/2020) 60 tablet 1     No Known Allergies           Physical Exam:     Ht 1.524 m (5')   Wt 63.5 kg (140 lb)   LMP 05/07/2020 (Approximate)   Breastfeeding No   BMI 27.34 kg/m    Estimated body mass index is 27.34 kg/m  as calculated from the following:    Height as of this encounter: 1.524 m (5').    Weight as of this encounter: 63.5 kg (140 lb).    Exam:  Constitutional: healthy, alert and no distress  Psychiatric: mentation appears normal and affect normal/bright          Assessment and Plan     1. Acute right flank pain  One week onset of non-radiating acute right flank pain, with no report of spinal involvement.  Not associated with any urinary symptoms. Not associated with fever/chills, nausea/vomiting or abdominal pain. No trauma noted. Pain is relieved when bending forward which may elude to a musculoskeletal pathology such as paraspinal muscular strain or muscle spasm. Also, could be a kidney stone or UTI. However, does have a significant similar presentation last year where she developed back pain and ended up having significant iron deficiency anemia requiring transfusion. She is currently experiencing uterine bleeding ongoing for two weeks. This may be related. Less likely a UTI given no urinary symptoms or pelvic/abdominal discomfort, however, will have her submit a urine sample. Given her risk factors, agrees to come in for a lab only appointment this afternoon to check her hemoglobin levels and UA/UC. Has been taking significant amount of ibuprofen, in which discussed risk of GI bleeding. Flexeril has helped her in the past and if her pain is more of a muscle spasm pathology, will likely have some relief. Discussed that she will have labs drawn and will follow up via phone regarding next steps. See below, but, should also see gynecology for evaluation of her ongoing uterine bleeding.   - Urinalysis, Micro If ([facility]); Future  - cyclobenzaprine (FLEXERIL) 5 MG tablet; Take 1 tablet (5 mg) by mouth 2 times daily as needed for muscle spasms  Dispense: 14 tablet; Refill: 1    2. Abnormal uterine bleeding  3. Iron deficiency anemia due to chronic blood loss  History of prior hospitalization with similar presentation as above from 7/7/2019-7/9/2019 and was found to have significant anemia requiring a blood transfusion. She followed up with gynecology and underwent a endometrial ablation and removal of a 4.5cm fibroid on 9/05/2019. She has been able to follow up after the procedure. She reports that since the ablation, she has still been experiencing what she calls her normal menstrual cycle every month and has 5-7 days  of bleeding. This is still postmenopausal bleeding and abnormal because she had a period of 1-2 years without bleeding. Discussed with patient that we should evaluate this further and make sure she is not experiencing significant anemia since her bleeding is now ongoing for the past 2 weeks. Also, encouraged her to make a follow up appointment with gynecology. With continued abnormal uterine bleeding, may likely need a hysterectomy and further evaluation.   - CBC with Plt ([facility]); Future  - INR  - aPTT  - FSH  - Urine HCG      3. Cramps of right lower extremity  Several months of prominent veins and cramping in her right lower extremity. Has a family history with her mother and sister recently having strokes. She is worried that she may have a blood clot in her leg. Discussed with patient that cramping may be a sign of electrolyte imbalance or vitamin D deficiency and we can add this to her lab list. For thorough evaluation, discussed that she should be seen in person to better evaluate what is going on. If swollen or red, would warrant an ultrasound to evaluate for DVT.   - [facility]); Future  - Basic Metabolic Panel ([facility])  - Results < 1 hr; Future    4. Routine health maintenance  Due for Hgb A1c and lipid panel for health maintenance. Also, encouraged smoking cessation. These will help evaluate for primary prevention for stroke and cardiovascular disease.   - Glycosylated Hgb A1C [facility]); Future  - Lipid Panel ([facility]) - Results < 1 hr; Future    Plan for lab only visit and follow up next week.     Refilled medications that would be required in the next 3 months.     After Visit Information:  Patient declined AVS     No follow-ups on file.    Appointment end time: 8:53 AM  This is a telephone visit that took 31 minutes.      Clinician location:  [facility]    Elayne Cam MD PGY2  [facility]    I precepted today with Dr. Jovon Lewis.

## 2020-05-21 NOTE — TELEPHONE ENCOUNTER
[facility] phone call message- general phone call:    Reason for call: She would like a call back with her lab results and her back is in so much pain she already she already had a apt with  this morning but she is not feeling any better she needs something prescribed.     Action desired: call back.    Return call needed: Yes    OK to leave a message on voice mail? Yes    Advised patient to response may take up to 2 business days: Yes    Primary language: English      needed? No    Call taken on May 21, 2020 at 1:48 PM by Geneva Browne

## 2020-05-21 NOTE — PROGRESS NOTES
Preceptor Attestation:   I talked to the patient on the phone. I discussed the patient with the resident. I have verified the content of the note, which accurately reflects my assessment of the patient and the plan of care.   Supervising Physician:  Jovon Lewis MD.

## 2020-05-22 ENCOUNTER — TELEPHONE (OUTPATIENT)
Dept: FAMILY MEDICINE | Facility: CLINIC | Age: 55
End: 2020-05-22

## 2020-05-22 DIAGNOSIS — B96.89 BACTERIAL VAGINOSIS: Primary | ICD-10-CM

## 2020-05-22 DIAGNOSIS — N76.0 BACTERIAL VAGINOSIS: Primary | ICD-10-CM

## 2020-05-22 DIAGNOSIS — R89.9 ABNORMAL LABORATORY TEST RESULT: Primary | ICD-10-CM

## 2020-05-22 LAB
25(OH)D3 SERPL-MCNC: 11.8 NG/ML (ref 30–80)
FSH: 4 MIU/ML
HBA1C MFR BLD: 5.2 % (ref 0–5.7)

## 2020-05-22 RX ORDER — METRONIDAZOLE 500 MG/1
500 TABLET ORAL 2 TIMES DAILY
Qty: 10 TABLET | Refills: 0 | Status: SHIPPED | OUTPATIENT
Start: 2020-05-22 | End: 2020-05-27

## 2020-05-22 NOTE — RESULT ENCOUNTER NOTE
Treating suspected bacterial vaginosis found in urine with 5 days of metronidazole. Results given via phone call to patient. See phone encounter for further description of encounter.   -Dr. Cam

## 2020-05-22 NOTE — TELEPHONE ENCOUNTER
Patient reports severe back pain that radiates to her butt cheek  No relief of pain while taking 800mg of ibuprofen every 4 hrs . She has not been able to sleep.  Preliminary lab results relayed to pt  Patient addement  that she receive a call back today.    Note routed to Dr.Roe Pyle RN

## 2020-05-22 NOTE — TELEPHONE ENCOUNTER
Relayed results over the phone to patient which were drawn on 5/21/2020, see visit notes for details. Discussed that UA/UC results are consistent with suspected bacterial vaginosis and will treat with 5 days of metronidazole twice a day. She is still in significant pain in her right flank. Prescribed 7 days worth of flexeril for her yesterday, planning on picking up today. Discussed that if pain does not improve over the weekend, she should give clinic a call back. Discussed that if develops any fever/chills, abdominal pain, worsening back pain despite abx, dizziness, chest pain or shortness of breath or other signs of worsening infection, should seek emergency medical attention or call our answering service pager. Patient understands and agrees with plan.     In person appointment scheduled with me on 5/27/2020.    Elayne Cam MD PGY2  [facility]

## 2020-05-27 ENCOUNTER — OFFICE VISIT (OUTPATIENT)
Dept: FAMILY MEDICINE | Facility: CLINIC | Age: 55
End: 2020-05-27
Payer: COMMERCIAL

## 2020-05-27 VITALS
RESPIRATION RATE: 16 BRPM | BODY MASS INDEX: 30.96 KG/M2 | OXYGEN SATURATION: 96 % | HEIGHT: 61 IN | TEMPERATURE: 98.4 F | DIASTOLIC BLOOD PRESSURE: 87 MMHG | HEART RATE: 81 BPM | SYSTOLIC BLOOD PRESSURE: 137 MMHG | WEIGHT: 164 LBS

## 2020-05-27 DIAGNOSIS — I83.811 VARICOSE VEINS OF RIGHT LOWER EXTREMITY WITH PAIN: ICD-10-CM

## 2020-05-27 DIAGNOSIS — E55.9 VITAMIN D INSUFFICIENCY: Primary | ICD-10-CM

## 2020-05-27 ASSESSMENT — MIFFLIN-ST. JEOR: SCORE: 1268.53

## 2020-05-27 NOTE — PROGRESS NOTES
"Preceptor attestation:  Vital signs reviewed: /87 (BP Location: Left arm)   Pulse 81   Temp 98.4  F (36.9  C) (Oral)   Resp 16   Ht 1.537 m (5' 0.51\")   Wt 74.4 kg (164 lb)   LMP 05/07/2020 (Approximate)   SpO2 96%   BMI 31.49 kg/m      Patient seen, evaluated, and discussed with the resident.  I have verified the content of the note, which accurately reflects my assessment of the patient and the plan of care.    Supervising physician: Aracely Rashid MD  [facility]  "

## 2020-05-27 NOTE — PROGRESS NOTES
ASSESSMENT AND PLAN     Lenora Biggs is a 54 year old female with a history of abnormal uterine bleeding s/p endometrial ablation who presents to clinic today for a follow up regarding her lab work and lower extremity pain. Recently, treated for a bacterial vaginosis infection with metronidazole and her back pain is now improved. Other labs showed that her lipid and Hgb A1c profile were within normal limits. Her blood counts were all normal as well with no signs of anemia.     1. Vitamin D insufficiency  During phone visit on 5/21/2020 mentioned that was having lower extremity intermittent cramping for the past several months. Checked BMP and vitamin D, which vitamin D level showed that it was low at 11.8, which puts her in the vitamin D insufficiency range. Discussed that with improvement of this level, we may also see improvement in her cramping and energy level. Counseled patient on taking 56593 units once a week for the next 8 weeks. Will come in for a lab draw in 8 weeks to reassess. Patient understands and agrees with plan. Future order placed.   - vitamin D3 (CHOLECALCIFEROL) 1.25 MG (97035 UT) capsule; Take 1 capsule (50,000 Units) by mouth every 7 days  Dispense: 4 capsule; Refill: 1  - Vitamin D level check in 8 weeks.     2. Varicose veins of right lower extremity with pain  Several months of intermittent pain in right medial lower leg associated with varicose veins. Does not affect her daily life. No warmth to touch, no redness, no swelling, no tenderness. Less likely a deep venous thrombosis. No other warning symptoms. Discussed that if worsens, should give us a call and be re-evaluated so that we can do imaging. Suggested warm compresses.     RTC in 8 weeks for follow up of vitamin D levels or sooner if develops new or worsening symptoms.    The patient was seen by, and discussed with, Dr. Aracely Rashid, who agrees with the plan.    Elayne Cam MD PGY2  [facility]          "SUBJECTIVE       Lenora Biggs is a 54 year old  female with a PMH significant for:     Patient Active Problem List   Diagnosis     Anemia, iron deficiency     Insomnia due to other mental disorder     PTSD (post-traumatic stress disorder)     Abnormal uterine bleeding     She presents for follow up.    Recently, had a phone visit on 5/21/2020 in which she had significant flank pain without urinary symptoms. UA/UC positive for bacterial vaginosis and was treated accordingly. Has finished the metronidazole and feels significant improvement.     She would like to discuss her intermittent leg cramping for the past several months that occurs bilaterally. She also has intermittent discomfort in her right medial leg right above tibial plateau where veins appear to \"pop out\" like varicose veins. She is able to ambulate without difficulty. Denies fever/chills, chest pain, shortness of breath, numbness/tingling/weakness, dizziness, presyncope.     Discussed low vitamin D level. She eats a lot of cheese and sits outside, however, does not note any other supplement or dairy intake.     PMH, Medications and Allergies were reviewed and updated as needed.        REVIEW OF SYSTEMS     ROS reviewed in HPI.         OBJECTIVE     Vitals:    05/27/20 1242   BP: 137/87   BP Location: Left arm   Pulse: 81   Resp: 16   Temp: 98.4  F (36.9  C)   TempSrc: Oral   SpO2: 96%   Weight: 74.4 kg (164 lb)   Height: 1.537 m (5' 0.51\")     Body mass index is 31.49 kg/m .    General: Alert, well appearing, no acute distress  Eyes: PERRL, EOMI, normal conjunctiva  HENT: Normocephalic, atraumatic; moist mucous membranes, no oropharyngeal erythema  Neck: No tenderness, no lymphadenopathy  Lungs: Clear to auscultation bilaterally, no wheezing, no rhonchi, no crackles  CV: Regular rate and rhythm, no murmur, no rubs, no gallops  Back: No CVA tenderness bilaterally.  Extremities: Right medial leg varicose veins above tibial plateua, nontender, " no swelling, no erythema.     No results found for this or any previous visit (from the past 24 hour(s)).

## 2020-05-27 NOTE — PATIENT INSTRUCTIONS
-Start taking 50,000 units once a week for the next 8 weeks. (1 pill once a week)  -On your 8th week, please come back into clinic to get your vitamin D level redrawn.  -You likely have varicose veins in your right leg, please let me know if anything worsens.     Please let us know if you need anything else!    Thanks,    Dr. Cam

## 2020-07-08 ENCOUNTER — TELEPHONE (OUTPATIENT)
Dept: FAMILY MEDICINE | Facility: CLINIC | Age: 55
End: 2020-07-08

## 2020-07-08 DIAGNOSIS — J30.2 SEASONAL ALLERGIC RHINITIS, UNSPECIFIED TRIGGER: ICD-10-CM

## 2020-07-08 DIAGNOSIS — G47.00 INSOMNIA, UNSPECIFIED TYPE: ICD-10-CM

## 2020-07-08 NOTE — TELEPHONE ENCOUNTER
[facility] phone call message- general phone call:    Reason for call: All medications and prescriptions change to [facility] [address], [place] [address]. Please give pt a call once medications have been sent.     Return call needed: Yes    OK to leave a message on voice mail? Yes    Primary language: English      needed? No    Call taken on July 8, 2020 at 12:11 PM by Grisel Flores-Cardona

## 2020-07-14 RX ORDER — CETIRIZINE HYDROCHLORIDE 10 MG/1
10 TABLET ORAL DAILY
Qty: 30 TABLET | Refills: 11 | Status: SHIPPED | OUTPATIENT
Start: 2020-07-14

## 2020-07-14 RX ORDER — TRAZODONE HYDROCHLORIDE 50 MG/1
50 TABLET, FILM COATED ORAL AT BEDTIME
Qty: 30 TABLET | Refills: 3 | Status: SHIPPED | OUTPATIENT
Start: 2020-07-14 | End: 2020-08-03

## 2020-07-14 NOTE — TELEPHONE ENCOUNTER
Medications switched to new pharmacy. Note routed to MA to call patient to notify them of change. Also, should be seen for a repeat vitamin D level since had deficiency and has been on high dose vitamin D.    Thank you,    Elayne Cam MD PGY3  [facility]

## 2020-07-14 NOTE — RESULT ENCOUNTER NOTE
Discussed at previous visit. Started on weekly high dose vitamin D and need to recheck soon.     Elayne Cam MD PGY3  [facility]

## 2020-08-02 DIAGNOSIS — G47.00 INSOMNIA, UNSPECIFIED TYPE: ICD-10-CM

## 2020-08-03 RX ORDER — TRAZODONE HYDROCHLORIDE 50 MG/1
TABLET, FILM COATED ORAL
Qty: 30 TABLET | Refills: 0 | Status: SHIPPED | OUTPATIENT
Start: 2020-08-03 | End: 2020-09-04

## 2020-09-04 DIAGNOSIS — G47.00 INSOMNIA, UNSPECIFIED TYPE: ICD-10-CM

## 2020-09-04 RX ORDER — TRAZODONE HYDROCHLORIDE 50 MG/1
TABLET, FILM COATED ORAL
Qty: 30 TABLET | Refills: 0 | Status: SHIPPED | OUTPATIENT
Start: 2020-09-04 | End: 2020-09-30

## 2020-09-30 DIAGNOSIS — G47.00 INSOMNIA, UNSPECIFIED TYPE: ICD-10-CM

## 2020-10-01 RX ORDER — TRAZODONE HYDROCHLORIDE 50 MG/1
50 TABLET, FILM COATED ORAL AT BEDTIME
Qty: 90 TABLET | Refills: 3 | Status: SHIPPED | OUTPATIENT
Start: 2020-10-01 | End: 2020-12-09

## 2020-10-04 DIAGNOSIS — E55.9 VITAMIN D INSUFFICIENCY: ICD-10-CM

## 2020-10-05 RX ORDER — CHOLECALCIFEROL (VITAMIN D3) 25 MCG
TABLET ORAL
Qty: 60 TABLET | Refills: 3 | Status: SHIPPED | OUTPATIENT
Start: 2020-10-05

## 2020-12-08 DIAGNOSIS — G47.00 INSOMNIA, UNSPECIFIED TYPE: ICD-10-CM

## 2020-12-09 RX ORDER — TRAZODONE HYDROCHLORIDE 50 MG/1
TABLET, FILM COATED ORAL
Qty: 30 TABLET | Refills: 0 | Status: SHIPPED | OUTPATIENT
Start: 2020-12-09 | End: 2021-01-11

## 2021-01-08 DIAGNOSIS — G47.00 INSOMNIA, UNSPECIFIED TYPE: ICD-10-CM

## 2021-01-11 RX ORDER — TRAZODONE HYDROCHLORIDE 50 MG/1
TABLET, FILM COATED ORAL
Qty: 30 TABLET | Refills: 3 | Status: SHIPPED | OUTPATIENT
Start: 2021-01-11 | End: 2021-04-06

## 2021-02-22 ENCOUNTER — ANCILLARY PROCEDURE (OUTPATIENT)
Dept: GENERAL RADIOLOGY | Facility: CLINIC | Age: 56
End: 2021-02-22
Attending: FAMILY MEDICINE
Payer: COMMERCIAL

## 2021-02-22 ENCOUNTER — OFFICE VISIT (OUTPATIENT)
Dept: FAMILY MEDICINE | Facility: CLINIC | Age: 56
End: 2021-02-22
Payer: COMMERCIAL

## 2021-02-22 VITALS
RESPIRATION RATE: 16 BRPM | DIASTOLIC BLOOD PRESSURE: 86 MMHG | WEIGHT: 174 LBS | BODY MASS INDEX: 33.41 KG/M2 | TEMPERATURE: 98.2 F | HEART RATE: 79 BPM | OXYGEN SATURATION: 98 % | SYSTOLIC BLOOD PRESSURE: 123 MMHG

## 2021-02-22 DIAGNOSIS — Z00.00 ENCOUNTER FOR HEALTH MAINTENANCE EXAMINATION IN ADULT: ICD-10-CM

## 2021-02-22 DIAGNOSIS — Z23 NEED FOR VACCINATION: ICD-10-CM

## 2021-02-22 DIAGNOSIS — M25.562 ACUTE PAIN OF LEFT KNEE: Primary | ICD-10-CM

## 2021-02-22 DIAGNOSIS — Z12.11 ENCOUNTER FOR SCREENING FOR MALIGNANT NEOPLASM OF COLON: ICD-10-CM

## 2021-02-22 DIAGNOSIS — M25.562 ACUTE PAIN OF LEFT KNEE: ICD-10-CM

## 2021-02-22 LAB
% IMMATURE GRANULOCYTES: 1 %
ABS IMMATURE GRANULOCYTES: 0.1 THOU/UL
ANION GAP SERPL CALCULATED.3IONS-SCNC: 9 MMOL/L (ref 5–18)
BASOPHILS # BLD AUTO: 0.1 THOU/UL (ref 0–0.2)
BASOPHILS NFR BLD AUTO: 1 % (ref 0–2)
BUN SERPL-MCNC: 10 MG/DL (ref 8–22)
CALCIUM SERPL-MCNC: 9.8 MG/DL (ref 8.5–10.5)
CHLORIDE SERPL-SCNC: 107 MMOL/L (ref 98–107)
CHOLEST SERPL-MCNC: 175 MG/DL
CO2 SERPL-SCNC: 21 MMOL/L (ref 22–31)
CREAT SERPL-MCNC: 0.6 MG/DL (ref 0.6–1.1)
EOSINOPHIL # BLD AUTO: 0.2 THOU/UL (ref 0–0.4)
EOSINOPHIL NFR BLD AUTO: 2 % (ref 0–6)
ERYTHROCYTE [DISTWIDTH] IN BLOOD BY AUTOMATED COUNT: 14 % (ref 11–14.5)
FASTING?: NORMAL
GLUCOSE SERPL-MCNC: 87 MG/DL (ref 70–125)
HBA1C MFR BLD: 5.5 % (ref 4.1–5.7)
HCT VFR BLD AUTO: 48.7 % (ref 35–47)
HDLC SERPL-MCNC: 58 MG/DL
HGB BLD-MCNC: 16.1 G/DL (ref 12–16)
LDLC SERPL CALC-MCNC: 101 MG/DL
LYMPHOCYTES # BLD AUTO: 3.1 THOU/UL (ref 0.8–4.4)
LYMPHOCYTES NFR BLD AUTO: 32 % (ref 20–40)
MCH RBC QN AUTO: 29.1 PG (ref 27–34)
MCHC RBC AUTO-ENTMCNC: 33.1 G/DL (ref 32–36)
MCV RBC AUTO: 88 FL (ref 80–100)
MONOCYTES # BLD AUTO: 0.8 THOU/UL (ref 0–0.9)
MONOCYTES NFR BLD AUTO: 8 % (ref 2–10)
NEUTROPHILS # BLD AUTO: 5.7 THOU/UL (ref 2–7.7)
NEUTROPHILS NFR BLD AUTO: 57 % (ref 50–70)
PLATELET # BLD AUTO: 248 THOU/UL (ref 140–440)
PMV BLD AUTO: 12.6 FL (ref 8.5–12.5)
POTASSIUM SERPL-SCNC: 4 MMOL/L (ref 3.5–5)
RBC # BLD AUTO: 5.53 MILL/UL (ref 3.8–5.4)
SODIUM SERPL-SCNC: 137 MMOL/L (ref 136–145)
TRIGL SERPL-MCNC: 82 MG/DL
WBC # BLD AUTO: 9.9 THOU/UL (ref 4–11)

## 2021-02-22 PROCEDURE — 36415 COLL VENOUS BLD VENIPUNCTURE: CPT | Performed by: STUDENT IN AN ORGANIZED HEALTH CARE EDUCATION/TRAINING PROGRAM

## 2021-02-22 PROCEDURE — 83036 HEMOGLOBIN GLYCOSYLATED A1C: CPT | Performed by: STUDENT IN AN ORGANIZED HEALTH CARE EDUCATION/TRAINING PROGRAM

## 2021-02-22 PROCEDURE — 90471 IMMUNIZATION ADMIN: CPT | Performed by: STUDENT IN AN ORGANIZED HEALTH CARE EDUCATION/TRAINING PROGRAM

## 2021-02-22 PROCEDURE — 73560 X-RAY EXAM OF KNEE 1 OR 2: CPT | Mod: LT | Performed by: RADIOLOGY

## 2021-02-22 PROCEDURE — 99214 OFFICE O/P EST MOD 30 MIN: CPT | Mod: 25 | Performed by: STUDENT IN AN ORGANIZED HEALTH CARE EDUCATION/TRAINING PROGRAM

## 2021-02-22 PROCEDURE — 90715 TDAP VACCINE 7 YRS/> IM: CPT | Performed by: STUDENT IN AN ORGANIZED HEALTH CARE EDUCATION/TRAINING PROGRAM

## 2021-02-22 RX ORDER — NAPROXEN 500 MG/1
500 TABLET ORAL 2 TIMES DAILY WITH MEALS
Qty: 60 TABLET | Refills: 0 | Status: SHIPPED | OUTPATIENT
Start: 2021-02-22 | End: 2021-03-24

## 2021-02-22 NOTE — PATIENT INSTRUCTIONS
1. Acute pain of left knee  Concern for arthritis of your left knee. Continue to do stretching and strength exercises. Water aerobics works great as well. Apply gel to knee up to four times a day. Take naproxen twice daily for pain.  - diclofenac (VOLTAREN) 1 % topical gel; Apply 4 g topically 4 times daily  Dispense: 350 g; Refill: 3  - naproxen (NAPROSYN) 500 MG tablet; Take 1 tablet (500 mg) by mouth 2 times daily (with meals)  Dispense: 60 tablet; Refill: 0  - XR Knee Left 3 Views; Future    2. Encounter for screening for malignant neoplasm of colon  They will call to schedule you.  - GASTROENTEROLOGY ADULT REF PROCEDURE ONLY; Future    Follow up in 4-6 weeks if no improvement of pain.    Take care,    Dr. Cam

## 2021-02-22 NOTE — PROGRESS NOTES
ASSESSMENT AND PLAN     1. Acute pain of left knee  Several months of left lower extremity pain that seems to be localized to her left knee involvement.  Pain is worse throughout the day and also worse at night, which is concerning for osteoarthritis versus potential neoplasm.  Exam notable for crepitus in her left knee with limited range of motion of her left knee.  Negative straight leg test bilaterally.  Discussed that we will start by taking an x-ray of her left knee as well as starting her on Voltaren gel that she can apply topically 4 times a day and naproxen 500 mg twice a day.  She has been doing her own physical therapy at home with stretching and exercising.  Discussed that it is good to try and strengthen the muscle groups around the knee.  Discussed that if x-ray does not show any evidence of osteoarthritis and pain continues, would suggest possible MRI or further imaging in the future.  - diclofenac (VOLTAREN) 1 % topical gel; Apply 4 g topically 4 times daily  Dispense: 350 g; Refill: 3  - naproxen (NAPROSYN) 500 MG tablet; Take 1 tablet (500 mg) by mouth 2 times daily (with meals)  Dispense: 60 tablet; Refill: 0  - XR Knee Left 1/2 Views; Future  - CBC w/ Diff and Plt ([facility])  - Lipid Lea ([facility]) - Results > 1 hr  - Basic Metabolic Profile ([facility])    2. Encounter for screening for malignant neoplasm of colon  Strong family history of colonic polyps in her sister and colon cancer.  She has not been able to get in for colonoscopy due to the pandemic.  Reordered to schedule her colonoscopy today.  Also discussed potential risk factors of cancer which include smoking, she is trying to quit however, does not want to do that at this time.  - GASTROENTEROLOGY ADULT REF PROCEDURE ONLY; Future  - CBC w/ Diff and Plt ([facility])  - Lipid Lea ([facility]) - Results > 1 hr  - Basic Metabolic Profile ([facility])    3. Need for vaccination  - TDAP VACCINE (Adacel, Boostrix)   [[id number]]    4. Encounter for health maintenance examination in adult  Is very concerned regarding family history of stroke and cardiovascular disease.  Also family history of cancer.  And family history of angioedema.  She would like to get all of her labs checked today to make sure that we are minimizing the risk of developing these diseases.  - Hemoglobin A1c ([facility])  - Vitamin D 25-Hydroxy ([facility])    RTC in 4-6 weeks for follow up of left leg pain or sooner if develops new or worsening symptoms.    The patient was seen by, and discussed with, Dr. Bill Uribe, who agrees with the plan.    Elayne Cam MD PGY3  [facility]         SUBJECTIVE       Lenora Biggs is a 55 year old  female with a PMH significant for:     Patient Active Problem List   Diagnosis     Anemia, iron deficiency     Insomnia due to other mental disorder     PTSD (post-traumatic stress disorder)     Abnormal uterine bleeding     She presents with leg pain.     Patient reports that she has had left lower extremity pain intermittently since June 2020.  She reports that the pain goes down her thigh to her toes.  She reports that symptoms are exacerbated since last Thursday.  Has tried a heating pad that helps some.  Denies any trauma, no trauma to her back.  Has had normal bowel movements.  Can move and do everything and one pain kicks and has to take a rest.  Drinking lots of water.  Occasionally taking ibuprofen.  She has had a past medical history of abnormal uterine bleeding with a history of an endometrial ablation this past year.  She has not had any more bleeding since November 2020.  She states that she occasionally gets abdominal cramping.  Denies numbness/tingling/weakness, saddle paresthesias, fever/chills, or bowel/bladder incontinence.  She does endorse that pain is worse by the end of the day after she has been on her feet for a while.  Although, she rates the pain is worse at night.      PMH,  Medications and Allergies were reviewed and updated as needed.        REVIEW OF SYSTEMS     ROS reviewed in HPI.         OBJECTIVE     Vitals:    02/22/21 1306   BP: 123/86   BP Location: Left arm   Patient Position: Sitting   Cuff Size: Adult Regular   Pulse: 79   Resp: 16   Temp: 98.2  F (36.8  C)   TempSrc: Oral   SpO2: 98%   Weight: 78.9 kg (174 lb)     Body mass index is 33.41 kg/m .    General: Alert, well appearing, no acute distress  Eyes: PERRL, EOMI, normal conjunctiva  Lungs: Clear to auscultation bilaterally, no wheezing, no rhonchi, no crackles  CV: Regular rate and rhythm, no murmur, no rubs, no gallops  Abdomen: Soft, nontender, non-distended, no masses palpated, normal bowel sounds  Extremities: Able to move all extremities.  Left lower extremity: Left knee with crepitus and limited range of motion with flexion, negative anterior/posterior drawer, negative varus/valgus, negative straight leg test.   Skin: Dry, no cyanosis, no abrasions, no discoloration.    No results found for this or any previous visit (from the past 24 hour(s)).

## 2021-02-22 NOTE — PROGRESS NOTES
Preceptor Attestation:    I discussed the patient with the resident and evaluated the patient in person. I have verified the content of the note, which accurately reflects my assessment of the patient and the plan of care.   Supervising Physician:  Bill Uribe MD.

## 2021-02-23 ENCOUNTER — TELEPHONE (OUTPATIENT)
Dept: GASTROENTEROLOGY | Facility: CLINIC | Age: 56
End: 2021-02-23

## 2021-02-23 DIAGNOSIS — Z11.59 ENCOUNTER FOR SCREENING FOR OTHER VIRAL DISEASES: ICD-10-CM

## 2021-02-23 LAB — 25(OH)D3 SERPL-MCNC: 20.9 NG/ML (ref 30–80)

## 2021-02-23 NOTE — TELEPHONE ENCOUNTER
Patient is scheduled for COLONOSCOPY with Dr. JOHNSON    Spoke with: AKOSUA    Date of Procedure: 03/03/2021    Location: [facility]    Sedation Type MAC    Pre-op for Unit J MAC and OR NOT NEEDED    (if yes advise patient they will need a pre-op prior to procedure)      Is patient on blood thinners? -NO (If yes- inform patient to follow up with PCP or provider for follow up instructions)     Informed patient they will need an adult  YES    Informed Patient of COVID Test Requirement YES    Preferred Pharmacy for Pre Prescription NA    Confirmed Nurse will call to complete assessment YES    Additional comments: NA

## 2021-02-24 ENCOUNTER — TELEPHONE (OUTPATIENT)
Dept: FAMILY MEDICINE | Facility: CLINIC | Age: 56
End: 2021-02-24

## 2021-02-24 ENCOUNTER — TELEPHONE (OUTPATIENT)
Dept: GASTROENTEROLOGY | Facility: OUTPATIENT CENTER | Age: 56
End: 2021-02-24

## 2021-02-24 DIAGNOSIS — Z12.31 ENCOUNTER FOR SCREENING MAMMOGRAM FOR BREAST CANCER: Primary | ICD-10-CM

## 2021-02-24 NOTE — RESULT ENCOUNTER NOTE
Please see phone encounter of discussion of results from 2/24/21.    Elayne Cam MD PGY3  [facility]

## 2021-02-24 NOTE — TELEPHONE ENCOUNTER
Called patient to discuss her lab and imaging results. Discussed that her labs looked greatly improved from prior and that her vitamin D was a little low. Also, her hemoglobin levels are very stable and counseled patient that it is okay to stop her iron supplementation at this time. She is no longer having any abnormal uterine bleeding. Also, discussed that her XR of her left knee was normal and that we can explore other imaging options and etiologies if the management for arthritis does not help to improve her pain, which includes imaging of her lower back and hip.     She is also scheduled for her colonoscopy on 3/3/21. She would also like to schedule her mammogram soon. She is due for that by July 2021 and stated that it would be quite okay to have it performed now.     She will follow up as needed. All questions and concerns were addressed with patient. Encouraged her to call our clinic at any time with questions.     Elayne Cam MD PGY3  [facility]

## 2021-02-24 NOTE — TELEPHONE ENCOUNTER
Patient taking any blood thinners ? No      Heart disease ? Denies      Lung disease ? Denies      Sleep apnea ? Denies      Diabetic ? Denies      Kidney disease ? Denies      Electronic implanted medical devices ? Denies      PTSD ? N/a      Prep instructions reviewed with patient ? Instructions, policy,MAC sedation plan reviewed. Advised patient to have someone stay with them for 24 hours post exam.     Yes    Pharmacy : N/a     Indication for procedure : Encounter for screening for malignant neoplasm of colon     Referring provider : Bill Uribe MD      Arrival Time : 2:30 PM

## 2021-02-25 ENCOUNTER — TELEPHONE (OUTPATIENT)
Dept: FAMILY MEDICINE | Facility: CLINIC | Age: 56
End: 2021-02-25

## 2021-02-25 ENCOUNTER — RECORDS - HEALTHEAST (OUTPATIENT)
Dept: ADMINISTRATIVE | Facility: OTHER | Age: 56
End: 2021-02-25

## 2021-02-25 ENCOUNTER — RECORDS - HEALTHEAST (OUTPATIENT)
Dept: SCHEDULING | Facility: CLINIC | Age: 56
End: 2021-02-25

## 2021-02-25 DIAGNOSIS — Z12.31 VISIT FOR SCREENING MAMMOGRAM: ICD-10-CM

## 2021-02-25 NOTE — TELEPHONE ENCOUNTER
Prior Authorization Retail Medication Request    Medication/Dose: diclofenac (VOLTAREN) 1 % topical gel  ICD code (if different than what is on RX):  Acute pain of left knee [M25.562]  - Primary   Previously Tried and Failed:    Rationale:      Insurance Name:  [employer] FULLY INSU*  Insurance ID:  [id number]       Pharmacy Information (if different than what is on RX)  [facility]  [id number] [address]  Phone:  [phone]

## 2021-02-25 NOTE — TELEPHONE ENCOUNTER
21   Mammogram Screening  [facility]   [phone]  Fax Orders to [phone]     Order faxed to [phone], they will contact patient to schedule.     Mali Bonner, CMA

## 2021-02-26 NOTE — TELEPHONE ENCOUNTER
Prior Authorization Approval    Authorization Effective Date: 1/27/2021  Authorization Expiration Date: 2/26/2022  Medication: diclofenac (VOLTAREN) 1 % topical gel-APPROVED  Approved Dose/Quantity:   Reference #:     Insurance Company: [employer] - Phone [phone] Fax [phone]  Expected CoPay:       CoPay Card Available:      Foundation Assistance Needed:    Which Pharmacy is filling the prescription (Not needed for infusion/clinic administered): [id number] [address]  Pharmacy Notified: Yes  Patient Notified: No    Pharmacy will notify patient when medication is ready.

## 2021-02-27 DIAGNOSIS — Z11.59 ENCOUNTER FOR SCREENING FOR OTHER VIRAL DISEASES: ICD-10-CM

## 2021-02-27 LAB
SARS-COV-2 RNA RESP QL NAA+PROBE: NORMAL
SPECIMEN SOURCE: NORMAL

## 2021-02-27 PROCEDURE — U0003 INFECTIOUS AGENT DETECTION BY NUCLEIC ACID (DNA OR RNA); SEVERE ACUTE RESPIRATORY SYNDROME CORONAVIRUS 2 (SARS-COV-2) (CORONAVIRUS DISEASE [COVID-19]), AMPLIFIED PROBE TECHNIQUE, MAKING USE OF HIGH THROUGHPUT TECHNOLOGIES AS DESCRIBED BY CMS-2020-01-R: HCPCS | Mod: 90 | Performed by: PATHOLOGY

## 2021-02-27 PROCEDURE — U0005 INFEC AGEN DETEC AMPLI PROBE: HCPCS | Mod: 90 | Performed by: PATHOLOGY

## 2021-02-28 LAB
LABORATORY COMMENT REPORT: NORMAL
SARS-COV-2 RNA RESP QL NAA+PROBE: NORMAL
SPECIMEN SOURCE: NORMAL

## 2021-03-02 ENCOUNTER — TELEPHONE (OUTPATIENT)
Dept: GASTROENTEROLOGY | Facility: OUTPATIENT CENTER | Age: 56
End: 2021-03-02

## 2021-03-02 ENCOUNTER — TELEPHONE (OUTPATIENT)
Dept: NURSING | Facility: CLINIC | Age: 56
End: 2021-03-02

## 2021-03-02 NOTE — TELEPHONE ENCOUNTER
Called patient in response to her voicemail about her Covid tests being inconclusive. Advised her writer sent a note to Dr. Sanchez for further direction on weather to reschedule or proceed. Patient verbalized understanding.

## 2021-03-02 NOTE — TELEPHONE ENCOUNTER
Patient informed of inconclusive/invalid COVID PCR screening lab test.  Patient is scheduled for a colonoscopy on 3/3/2021 at [facility]. Patient will contact [facility] to see if she is still scheduled for her procedure tomorrow before she begins prep.  She will call back if a new PCR lab order needs to be placed for testing.     Tammie Severson, LPN

## 2021-03-12 ENCOUNTER — TELEPHONE (OUTPATIENT)
Dept: GASTROENTEROLOGY | Facility: CLINIC | Age: 56
End: 2021-03-12

## 2021-03-12 NOTE — TELEPHONE ENCOUNTER
Left message for patient to call back to schedule colonoscopy.     Procedure: Lower Endoscopy    Lower Endoscopy Type: Colonoscopy    Purpose of Colonoscopy Procedure: Screening    Colonoscopy Sedation: Conscious/Moderate    Preferred Location: [facility]    Scheduling Instructions: If you have not heard from the scheduling office within 2 business days, please call [phone].        Dx: Encounter for screening for malignant neoplasm of colon [Z12.11]

## 2021-03-18 ENCOUNTER — TELEPHONE (OUTPATIENT)
Dept: GASTROENTEROLOGY | Facility: CLINIC | Age: 56
End: 2021-03-18

## 2021-03-18 NOTE — TELEPHONE ENCOUNTER
2nd schedule attempt    Procedure: Lower Endoscopy    Lower Endoscopy Type: Colonoscopy    Purpose of Colonoscopy Procedure: Screening    Colonoscopy Sedation: Conscious/Moderate    Preferred Location: [facility]    Scheduling Instructions: If you have not heard from the scheduling office within 2 business days, please call [phone].           Associated Diagnoses    Encounter for screening for malignant neoplasm of colon [Z12.11]

## 2021-04-06 DIAGNOSIS — G47.00 INSOMNIA, UNSPECIFIED TYPE: ICD-10-CM

## 2021-04-06 RX ORDER — TRAZODONE HYDROCHLORIDE 50 MG/1
TABLET, FILM COATED ORAL
Qty: 90 TABLET | Refills: 1 | Status: SHIPPED | OUTPATIENT
Start: 2021-04-06 | End: 2021-10-07

## 2021-05-28 ENCOUNTER — RECORDS - HEALTHEAST (OUTPATIENT)
Dept: ADMINISTRATIVE | Facility: CLINIC | Age: 56
End: 2021-05-28

## 2021-05-31 NOTE — ANESTHESIA PREPROCEDURE EVALUATION
Anesthesia Evaluation      Patient summary reviewed   No history of anesthetic complications     Airway   Mallampati: I  Neck ROM: full   Pulmonary - negative ROS and normal exam                          Cardiovascular - negative ROS and normal exam  (-) murmur  Rhythm: regular  Rate: normal,    no murmur      Neuro/Psych - negative ROS     Endo/Other - negative ROS      GI/Hepatic/Renal - negative ROS           Dental - normal exam                        Anesthesia Plan  Planned anesthetic: MAC    ASA 1     Anesthetic plan and risks discussed with: patient  Anesthesia plan special considerations: antiemetics,   Post-op plan: routine recovery

## 2021-06-01 NOTE — OP NOTE
Date of surgery: 09/05/19    Pre operative Diagnosis:   1. Abnormal uterine bleeding with severe anemia  2. Uterine fibroids    Post operative diagnosis:   1. Abnormal uterine bleeding with severe anemia  2. Uterine fibroids  3. Uterine polyps    Procedure:   1) Operative hysteroscopy with Resectr  2) Sofia endometrial ablation    Surgeon: Ingrid Yeboah MD    Anesthesia: MAC    Fluids: LR 1000mL   UOP: 15mL  EBL: 15mL  Fluid deficit: 250mL    Complications: None    Specimen to pathology:   Endometrial curretings    Findings:  --Small anteverted uterus  --Normal appearing endometrial tissue with small endometrial polyps, resected  --Bilateral tubal ostia were visualized and noted to appear normal, as was the fundus  --Ablated endometrial tissue     Indication:   54yo who presented as an outpatient with heavy menstrual bleeding and secondary anemia requiring hospitalization for blood transfusion. Pelvic ultrasound showed a 4cm subserosal fibroid and 1 cm intramural fibroid that did not abut the endometrial cavity.  An outpatient endometrial biopsy was benign. Options were reviewed with the patient.  She was consented for an operative hysteroscopy with endometrial ablation. All risks and benefits were reviewed with the patient and the patient consented to the procedure.     Procedure:   The patient was brought to the operating room where a time out was performed.  She was placed in the dorsal lithotomy position, prepped and draped in the normal sterile fashion.  The bladder was drained with a straight catheter. A bivalve speculum was inserted into the vagina.  A tenaculum was placed on the anterior lip of the cervix. The cervix was dilated with Shantelle dilators to 5mm.  The hysteroscope was primed and introduced into the uterine cavity.  Bilateral ostia were visualized as was the fundus.  Several small endometrial polyps were resected with the Resectr device.  Additional endometrial samplings were obtained throughout  the entire cavity. The hysteroscope was removed from the cavity.      The uterus sounded to 10cm.  The endocervical canal measured to 4cm.  The Sofia device was set to 6cm and inserted to the fundus.  The cervical balloon was inflated and a seal integrity check was completed.  The ablation system was then activated and the cycle completed.  The device was removed.  The hysteroscope was reinserted and confirmed a blanched cavity to the fundus. Upon removal of the hysteroscopy, the endocervical canal transitioned from blanched white to pink tissue.     All instruments were removed from the cervix and vagina.  Bleeding at the left tenaculum site improved with pressure and application of Monsel's solution. Excellent hemostasis was noted.   The patient tolerated the procedure well and was taken to the recovery room in stable condition.     Follow-up: Precautions and post-procedure instructions were given to the patient.  Appointment was made for a post-procedure check-up in approximately 2 weeks.

## 2021-06-01 NOTE — ANESTHESIA CARE TRANSFER NOTE
Last vitals:   Vitals:    09/05/19 1010   BP: 105/73   Pulse: 98   Resp: 20   Temp: 36.4  C (97.5  F)   SpO2: 100%     Patient's level of consciousness is drowsy  Spontaneous respirations: yes  Maintains airway independently: yes  Dentition unchanged: yes  Oropharynx: oropharynx clear of all foreign objects    QCDR Measures:  ASA# 20 - Surgical Safety Checklist: WHO surgical safety checklist completed prior to induction    PQRS# 430 - Adult PONV Prevention: 4558F - Pt received => 2 anti-emetic agents (different classes) preop & intraop  ASA# 8 - Peds PONV Prevention: NA - Not pediatric patient, not GA or 2 or more risk factors NOT present  PQRS# 424 - Nia-op Temp Management: 4559F - At least one body temp DOCUMENTED => 35.5C or 95.9F within required timeframe  PQRS# 426 - PACU Transfer Protocol: - Transfer of care checklist used  ASA# 14 - Acute Post-op Pain: ASA14B - Patient did NOT experience pain >= 7 out of 10

## 2021-06-01 NOTE — ANESTHESIA POSTPROCEDURE EVALUATION
Patient: Lenora Biggs  HYSTEROSCOPY D&C, ENDOMETRIAL ABLATION ROBY  Anesthesia type: MAC    Patient location: Phase II Recovery  Last vitals:   Vitals Value Taken Time   /88 9/5/2019 10:52 AM   Temp 36.4  C (97.6  F) 9/5/2019 10:36 AM   Pulse 72 9/5/2019 10:52 AM   Resp 18 9/5/2019 10:50 AM   SpO2 97 % 9/5/2019 10:52 AM   Vitals shown include unvalidated device data.  Post vital signs: stable  Level of consciousness: awake and responds to simple questions  Post-anesthesia pain: pain controlled  Post-anesthesia nausea and vomiting: no  Pulmonary: unassisted, return to baseline  Cardiovascular: stable and blood pressure at baseline  Hydration: adequate  Anesthetic events: no    QCDR Measures:  ASA# 11 - Nia-op Cardiac Arrest: ASA11B - Patient did NOT experience unanticipated cardiac arrest  ASA# 12 - Nia-op Mortality Rate: ASA12B - Patient did NOT die  ASA# 13 - PACU Re-Intubation Rate: ASA13B - Patient did NOT require a new airway mgmt  ASA# 10 - Composite Anes Safety: ASA10A - No serious adverse event    Additional Notes:

## 2021-06-01 NOTE — H&P
I have performed an assessment and examined the patient, as necessary, to update the patient's current status that may have changed since the prior History and Physical.  The History & Physical has been reviewed and no updates are needed.    Ingrid Yeboah MD

## 2021-06-01 NOTE — ANESTHESIA PREPROCEDURE EVALUATION
Anesthesia Evaluation      Patient summary reviewed   No history of anesthetic complications     Airway   Mallampati: I  Neck ROM: full   Pulmonary - normal exam   (+) a smoker                         Cardiovascular - negative ROS and normal exam  (-) murmur  Rhythm: regular  Rate: normal,    no murmur      Neuro/Psych - negative ROS     Endo/Other - negative ROS      GI/Hepatic/Renal - negative ROS           Dental - normal exam                          Anesthesia Plan  Planned anesthetic: MAC    ASA 2     Anesthetic plan and risks discussed with: patient  Anesthesia plan special considerations: antiemetics,   Post-op plan: routine recovery

## 2021-06-03 ENCOUNTER — OFFICE VISIT (OUTPATIENT)
Dept: FAMILY MEDICINE | Facility: CLINIC | Age: 56
End: 2021-06-03
Payer: COMMERCIAL

## 2021-06-03 VITALS
WEIGHT: 176.8 LBS | HEART RATE: 71 BPM | BODY MASS INDEX: 33.95 KG/M2 | TEMPERATURE: 98.4 F | RESPIRATION RATE: 16 BRPM | DIASTOLIC BLOOD PRESSURE: 83 MMHG | SYSTOLIC BLOOD PRESSURE: 138 MMHG | OXYGEN SATURATION: 97 %

## 2021-06-03 VITALS
BODY MASS INDEX: 30.04 KG/M2 | HEIGHT: 60 IN | WEIGHT: 153 LBS | BODY MASS INDEX: 30.04 KG/M2 | HEIGHT: 60 IN | WEIGHT: 153 LBS

## 2021-06-03 VITALS — BODY MASS INDEX: 30.04 KG/M2 | HEIGHT: 60 IN | WEIGHT: 153 LBS

## 2021-06-03 DIAGNOSIS — N93.9 ABNORMAL UTERINE BLEEDING: Primary | ICD-10-CM

## 2021-06-03 DIAGNOSIS — M25.552 HIP PAIN, LEFT: ICD-10-CM

## 2021-06-03 DIAGNOSIS — Z12.11 SCREENING FOR MALIGNANT NEOPLASM OF COLON: ICD-10-CM

## 2021-06-03 LAB
% IMMATURE GRANULOCYTES: 0 % (ref 0–0)
ABS IMMATURE GRANULOCYTES: 0 10E9/L (ref 0–0)
BACTERIA: NORMAL
BACTERIA: NORMAL
BASOPHILS # BLD AUTO: 0 10E9/L (ref 0–0.2)
BASOPHILS NFR BLD AUTO: 1 % (ref 0–2)
BILIRUBIN UR: NEGATIVE MG/DL
BLOOD UR: ABNORMAL MG/DL
CASTS: NORMAL /LPF
CLUE CELLS: NORMAL
CRYSTAL URINE: NORMAL /LPF
EOSINOPHIL # BLD AUTO: 0.2 10E9/L (ref 0–0.7)
EOSINOPHIL NFR BLD AUTO: 3 % (ref 0–6)
EPITHELIAL CELLS UR: NORMAL /LPF (ref 0–2)
ERYTHROCYTE [DISTWIDTH] IN BLOOD BY AUTOMATED COUNT: 14 % (ref 10–15)
GLUCOSE URINE: NEGATIVE
HCT VFR BLD AUTO: 46.1 % (ref 35–47)
HEMOGLOBIN: 15.1 G/DL (ref 11.7–15.7)
KETONES UR QL: NEGATIVE MG/DL
LEUKOCYTE ESTERASE UR: NEGATIVE
LYMPHOCYTES # BLD AUTO: 2.3 10E9/L (ref 0.8–5.3)
LYMPHOCYTES NFR BLD AUTO: 27.2 % (ref 20–48)
MCH RBC QN AUTO: 29 PG (ref 26.5–35)
MCHC RBC AUTO-ENTMCNC: 32.8 G/DL (ref 32–36)
MCV RBC AUTO: 88.7 FL (ref 78–100)
MONOCYTES # BLD AUTO: 0.5 10E9/L (ref 0–1.3)
MONOCYTES NFR BLD AUTO: 6 % (ref 0–12)
MOTILE TRICHOMONAS: NEGATIVE
MUCOUS URINE: NORMAL LPF
NEUTROPHILS # BLD AUTO: 5.3 10E9/L (ref 1.6–8.3)
NEUTROPHILS NFR BLD AUTO: 63.2 % (ref 40–75)
NITRITE UR QL STRIP: NEGATIVE MG/DL
ODOR: NORMAL
PH UR STRIP: 5.5 [PH] (ref 4.5–8)
PH WET PREP: NORMAL (ref 3.8–4.5)
PLATELET # BLD AUTO: 257 10E9/L (ref 150–450)
PROTEIN UR: ABNORMAL MG/DL
RBC # BLD AUTO: 5.2 10E12/L (ref 3.8–5.2)
RBC URINE: <2 /HPF
SP GR UR STRIP: >=1.03 (ref 1–1.03)
UROBILINOGEN UR STRIP-ACNC: ABNORMAL E.U./DL
WBC # BLD AUTO: 8.4 10E9/L (ref 4–11)
WBC URINE: <2 /HPF
WBC WET PREP: NORMAL (ref 2–5)
YEAST: NORMAL

## 2021-06-03 PROCEDURE — 81001 URINALYSIS AUTO W/SCOPE: CPT | Performed by: STUDENT IN AN ORGANIZED HEALTH CARE EDUCATION/TRAINING PROGRAM

## 2021-06-03 PROCEDURE — 99214 OFFICE O/P EST MOD 30 MIN: CPT | Mod: GC | Performed by: STUDENT IN AN ORGANIZED HEALTH CARE EDUCATION/TRAINING PROGRAM

## 2021-06-03 PROCEDURE — 36415 COLL VENOUS BLD VENIPUNCTURE: CPT | Performed by: STUDENT IN AN ORGANIZED HEALTH CARE EDUCATION/TRAINING PROGRAM

## 2021-06-03 PROCEDURE — 85025 COMPLETE CBC W/AUTO DIFF WBC: CPT | Performed by: STUDENT IN AN ORGANIZED HEALTH CARE EDUCATION/TRAINING PROGRAM

## 2021-06-03 PROCEDURE — 87210 SMEAR WET MOUNT SALINE/INK: CPT | Performed by: STUDENT IN AN ORGANIZED HEALTH CARE EDUCATION/TRAINING PROGRAM

## 2021-06-03 RX ORDER — CYCLOBENZAPRINE HCL 10 MG
10 TABLET ORAL 3 TIMES DAILY PRN
Qty: 30 TABLET | Refills: 0 | Status: SHIPPED | OUTPATIENT
Start: 2021-06-03

## 2021-06-03 NOTE — PROGRESS NOTES
"  ASSESSMENT AND PLAN     1. Abnormal uterine bleeding  H/o endometrial ablation in 9/2019. Has had vaginal bleeding since the ablation. Presenting today with dark brown/red malodorous vaginal discharge that has been ongoing since Monday. Concern for abnormal uterine bleeding and infection. Given that she is still having \"menstrual cycles\" since the ablation, would like for her to be seen by OBGYN again for follow up, and if continues to bleed, may consider other options such as repeat ablation versus hysterectomy. Also, will rule out infection. If has blood in urine, without infection, may consider a urology consult in the future. Will also obtain a transvaginal ultrasound given onset of this new bleeding.   - US Pelvic Complete with Transvaginal; Future  - CBC with Diff Plt ([facility])  - Urinalysis, Micro If ([facility])  - Wet Prep ([facility])  - Urine Microscopic ([facility])    2. Hip pain, left  Having left suprapubic and left hip tenderness. May be consistent with trochanteric bursitis versus hip osteoarthritis versus muscle spasm. Has been trying tylenol. Would like to avoid ibuprofen given history of anemia and suspected GI bleed in the past.   - cyclobenzaprine (FLEXERIL) 10 MG tablet; Take 1 tablet (10 mg) by mouth 3 times daily as needed for muscle spasms  Dispense: 30 tablet; Refill: 0    3. Screening for malignant neoplasm of colon  - Fecal Occult Blood - FIT, iFOB (P ); Future    RTC in 2-4 weeks for follow up of abnormal uterine bleeding or sooner if develops new or worsening symptoms.    The patient was seen by, and discussed with, Dr. Donaldo Colón, who agrees with the plan.    Elayne Cam MD PGY3  [facility]         SUBJECTIVE       Lenora Biggs is a 55 year old  female with a PMH significant for:     Patient Active Problem List   Diagnosis     Anemia, iron deficiency     Insomnia due to other mental disorder     PTSD (post-traumatic stress disorder)     Abnormal uterine " "bleeding     She presents with increased menstrual bleeding.    Having increased vaginal bleeding that is really dark and malodorous. Has been in bed since Monday night and has not been able to go to work. Blood appears dark brown and red tinged. She states that the bleeding/discharge, \"smells like something is dead.\". has been feeling increased pain in her left hip/left leg. 9/10 on pain scale. Has been trying to take tylenol which does not improve the symptoms. Denies fever/chills, shortness of breath, chest pain, abdominal pain/cramping, dysuria, urinary frequency/urgency.     PMH, Medications and Allergies were reviewed and updated as needed.        REVIEW OF SYSTEMS     ROS reviewed in HPI.         OBJECTIVE     Vitals:    06/03/21 1040   BP: 138/83   BP Location: Left arm   Patient Position: Sitting   Cuff Size: Adult Regular   Pulse: 71   Resp: 16   Temp: 98.4  F (36.9  C)   TempSrc: Oral   SpO2: 97%   Weight: 80.2 kg (176 lb 12.8 oz)     Body mass index is 33.95 kg/m .    General: Alert, well appearing, no acute distress  Lungs: Clear to auscultation bilaterally, no wheezing, no rhonchi, no crackles  CV: Regular rate and rhythm, no murmur, no rubs, no gallops  Abdomen: Soft,  Left suprapubic tenderness, non-distended, no masses palpated, normal bowel sounds  : Declined, however, able to visualize urine which is blood-tinged.   Extremities: Able to move all extremities, Tenderness to left inguinal region, able to flex/extend hip.   Neuro: Grossly normal  Skin: Dry, no cyanosis, no abrasions, no discoloration, no pallor    No results found for this or any previous visit (from the past 24 hour(s)).        "

## 2021-06-03 NOTE — LETTER
Selina 3, 2021       Re: Lenora Biggs  [id number]  [place] [address]         To whom it may concern:    Lenora Biggs was seen in clinic by me, on 6/3/21. Please excuse her from work from 5/31/21 to 6/6/21 with a return to work on Monday, 6/7/21. Please call our clinic with any questions.     Will also need to be excused for procedures ordered this visit.       Sincerely,      Elayne Cam MD

## 2021-06-03 NOTE — PATIENT INSTRUCTIONS
1. Abnormal uterine bleeding  We should get you back to see gynecology soon for further evaluation. Concerning for continued abnormal bleeding. We will evaluate for infection too, however, think we should see what is going on with your uterus and ovaries.  - US Pelvic Complete with Transvaginal; Future  - CBC with Diff Plt ([facility])  - Urinalysis, Micro If (P )  - Wet Prep ([facility])    2. Hip pain, left  - cyclobenzaprine (FLEXERIL) 10 MG tablet; Take 1 tablet (10 mg) by mouth 3 times daily as needed for muscle spasms  Dispense: 30 tablet; Refill: 0    3. Fit test for colon cancer.       Follow up with me in 2 weeks to see how everything is going!    Take care,    Dr. Cam

## 2021-06-09 ENCOUNTER — OFFICE VISIT (OUTPATIENT)
Dept: FAMILY MEDICINE | Facility: CLINIC | Age: 56
End: 2021-06-09
Payer: COMMERCIAL

## 2021-06-09 VITALS
DIASTOLIC BLOOD PRESSURE: 83 MMHG | SYSTOLIC BLOOD PRESSURE: 151 MMHG | BODY MASS INDEX: 33.45 KG/M2 | HEART RATE: 70 BPM | RESPIRATION RATE: 22 BRPM | WEIGHT: 174.2 LBS | TEMPERATURE: 97.5 F | OXYGEN SATURATION: 94 %

## 2021-06-09 DIAGNOSIS — G44.209 TENSION HEADACHE: Primary | ICD-10-CM

## 2021-06-09 DIAGNOSIS — R03.0 ELEVATED BLOOD PRESSURE READING WITHOUT DIAGNOSIS OF HYPERTENSION: ICD-10-CM

## 2021-06-09 PROCEDURE — 99213 OFFICE O/P EST LOW 20 MIN: CPT | Mod: GC | Performed by: STUDENT IN AN ORGANIZED HEALTH CARE EDUCATION/TRAINING PROGRAM

## 2021-06-09 RX ORDER — IBUPROFEN 400 MG/1
400 TABLET, FILM COATED ORAL EVERY 6 HOURS PRN
Qty: 60 TABLET | Refills: 3 | Status: SHIPPED | OUTPATIENT
Start: 2021-06-09

## 2021-06-09 NOTE — LETTER
June 14, 2021      Lenora Biggs   [id number]  [place] [address]        Dear Lenora,    We have been unable to reach you by phone to schedule the PELVIC ULTRASOUND. Please contact the [facility] at [phone] to schedule.       Sincerely,    Lexii JARAMILLO  Referral Coordinator

## 2021-06-09 NOTE — PROGRESS NOTES
Preceptor Attestation:    I discussed the patient with the resident and evaluated the patient in person. I have verified the content of the note, which accurately reflects my assessment of the patient and the plan of care.   Supervising Physician:  Kris Shea MD.

## 2021-06-09 NOTE — PROGRESS NOTES
SUBJECTIVE       Lenora Biggs is a 55 year old  female with a PMH significant for:     Patient Active Problem List   Diagnosis     Anemia, iron deficiency     Insomnia due to other mental disorder     PTSD (post-traumatic stress disorder)     Abnormal uterine bleeding     Patient presents with:  Hypertension: check bp  Headache: headache, light headache and nausea    Headache and Nausea:  She has had a headache for the last 2 days and with associated lightheadedness. The lightheadedness has since resolved. She has also felt nauseous yesterday and today. Her headache involves the entire head. No photophobia or phonophobia. No blurry vision, fever, chills, vomiting, no runny nose or congestion.     BP:   She also would like her BP checked again today, at her last visit she noticed it was borderline high. No chest pain.     BP Readings from Last 6 Encounters:   06/09/21 (!) 151/83   06/03/21 138/83   02/22/21 123/86   05/27/20 137/87   12/30/19 115/80   08/19/19 116/76         PMH, Medications and Allergies were reviewed and updated as needed.          OBJECTIVE     Vitals:    06/09/21 1036 06/09/21 1037   BP: (!) 148/91 (!) 151/83   Pulse: 70    Resp: 22    Temp: 97.5  F (36.4  C)    TempSrc: Tympanic    SpO2: 94%    Weight: 79 kg (174 lb 3.2 oz)      Body mass index is 33.45 kg/m .    General :  healthy and alert, no distress  HEENT:  PERRLA, EOMI, MMM,  Cardiovascular: regular rate and rhythm, normal S1/S2 no other heart sounds  Respiratory:  CTA, normal respiratory effort  Musculoskeletal: no edema, moves all fours  Skin:   no lesions or rashes on exposed skin      No results found for this or any previous visit (from the past 24 hour(s)).    ASSESSMENT AND PLAN       Lenora was seen today for hypertension and headache.    Diagnoses and all orders for this visit:    Tension headache  -     acetaminophen-caffeine (EXCEDRIN TENSION HEADACHE) 500-65 MG TABS; Take 2 tablets by mouth every 6  hours as needed for mild pain  -     ibuprofen (ADVIL/MOTRIN) 400 MG tablet; Take 1 tablet (400 mg) by mouth every 6 hours as needed for moderate pain    Elevated blood pressure reading without diagnosis of hypertension    Headache is likely tension type, aggravated by dehydration and possibly related to caffeine withdrawal. Will monitor and treat as above. Follow-up on Friday if not improved. Her elevated BP may be due to her headache, will not treat at this time as BP has historically been normal, recheck BP on friday.       Discussed with MD Jan Hughes MD PGY 3  [facility]

## 2021-06-09 NOTE — LETTER
RETURN TO WORK/SCHOOL FORM    6/9/2021    Re: Lenora Biggs  1965      To Whom It May Concern:     Lenora Biggs was seen in clinic today..  She may return to work without restrictions on 6/10/21            Jan Rehman MD  6/9/2021 11:04 AM

## 2021-06-14 NOTE — PATIENT INSTRUCTIONS
US PELVIC COMPLETE  6/3- NO ANSWER,LVM  6/14- NO ANSWER, MAILBOX FULL. LETTER SENT.     Lexii Oliveros

## 2021-06-17 NOTE — RESULT ENCOUNTER NOTE
Will obtain repeat UA at next visit and refer to Urology if still having macroscopic hematuria.    Elayne Cam MD PGY3  [facility]

## 2021-10-07 DIAGNOSIS — G47.00 INSOMNIA, UNSPECIFIED TYPE: ICD-10-CM

## 2021-10-07 RX ORDER — TRAZODONE HYDROCHLORIDE 50 MG/1
TABLET, FILM COATED ORAL
Qty: 90 TABLET | Refills: 1 | Status: SHIPPED | OUTPATIENT
Start: 2021-10-07

## 2022-03-01 ENCOUNTER — HOSPITAL ENCOUNTER (EMERGENCY)
Facility: HOSPITAL | Age: 57
Discharge: HOME | End: 2022-03-01
Attending: EMERGENCY MEDICINE
Payer: COMMERCIAL

## 2022-03-01 VITALS
WEIGHT: 170.44 LBS | OXYGEN SATURATION: 97 % | RESPIRATION RATE: 18 BRPM | HEART RATE: 70 BPM | TEMPERATURE: 98 F | DIASTOLIC BLOOD PRESSURE: 89 MMHG | SYSTOLIC BLOOD PRESSURE: 153 MMHG

## 2022-03-01 DIAGNOSIS — S39.012A STRAIN OF LUMBAR REGION, INITIAL ENCOUNTER: ICD-10-CM
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
## 2022-03-01 DIAGNOSIS — S16.1XXA STRAIN OF NECK MUSCLE, INITIAL ENCOUNTER: ICD-10-CM

## 2022-03-01 DIAGNOSIS — R03.0 ELEVATED BLOOD-PRESSURE READING WITHOUT DIAGNOSIS OF HYPERTENSION: ICD-10-CM

## 2022-03-01 PROCEDURE — 99283 EMERGENCY DEPT VISIT LOW MDM: CPT | Mod: ER

## 2022-03-01 RX ORDER — METHOCARBAMOL 750 MG/1
1500 TABLET, FILM COATED ORAL 3 TIMES DAILY
Qty: 30 TABLET | Refills: 0 | Status: SHIPPED | OUTPATIENT
Start: 2022-03-01 | End: 2022-03-06
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
